# Patient Record
Sex: FEMALE | Race: BLACK OR AFRICAN AMERICAN | NOT HISPANIC OR LATINO | Employment: FULL TIME | URBAN - METROPOLITAN AREA
[De-identification: names, ages, dates, MRNs, and addresses within clinical notes are randomized per-mention and may not be internally consistent; named-entity substitution may affect disease eponyms.]

---

## 2024-07-08 ENCOUNTER — TELEPHONE (OUTPATIENT)
Dept: TRANSPLANT | Facility: CLINIC | Age: 54
End: 2024-07-08
Payer: COMMERCIAL

## 2024-07-08 NOTE — TELEPHONE ENCOUNTER
MA notes per Pre Dialysis adherence form     FOR THE PAST THREE MONTHS:    0-Appt Adhrence  0-No show    No concerns with labs, care giving, transportation, or mental health    Scanned in pt's media     Verito Jerry  Abdominal Transplant MA

## 2024-07-11 ENCOUNTER — OFFICE VISIT (OUTPATIENT)
Dept: TRANSPLANT | Facility: CLINIC | Age: 54
End: 2024-07-11
Payer: COMMERCIAL

## 2024-07-11 ENCOUNTER — HOSPITAL ENCOUNTER (OUTPATIENT)
Dept: RADIOLOGY | Facility: HOSPITAL | Age: 54
Discharge: HOME | End: 2024-07-11
Payer: COMMERCIAL

## 2024-07-11 VITALS
SYSTOLIC BLOOD PRESSURE: 143 MMHG | OXYGEN SATURATION: 100 % | WEIGHT: 166 LBS | BODY MASS INDEX: 30.55 KG/M2 | HEART RATE: 69 BPM | DIASTOLIC BLOOD PRESSURE: 90 MMHG | RESPIRATION RATE: 16 BRPM | TEMPERATURE: 97 F | HEIGHT: 62 IN

## 2024-07-11 DIAGNOSIS — N18.6 END STAGE RENAL DISEASE: ICD-10-CM

## 2024-07-11 DIAGNOSIS — Z71.85 IMMUNIZATION COUNSELING: ICD-10-CM

## 2024-07-11 DIAGNOSIS — Z76.82 ORGAN TRANSPLANT CANDIDATE: ICD-10-CM

## 2024-07-11 DIAGNOSIS — E08.22 DIABETES MELLITUS DUE TO UNDERLYING CONDITION WITH STAGE 5 CHRONIC KIDNEY DISEASE NOT ON CHRONIC DIALYSIS, WITHOUT LONG-TERM CURRENT USE OF INSULIN: ICD-10-CM

## 2024-07-11 DIAGNOSIS — N18.5 DIABETES MELLITUS DUE TO UNDERLYING CONDITION WITH STAGE 5 CHRONIC KIDNEY DISEASE NOT ON CHRONIC DIALYSIS, WITHOUT LONG-TERM CURRENT USE OF INSULIN: ICD-10-CM

## 2024-07-11 DIAGNOSIS — I12.9 BENIGN HYPERTENSION WITH CKD (CHRONIC KIDNEY DISEASE) STAGE IV: ICD-10-CM

## 2024-07-11 DIAGNOSIS — F32.A DEPRESSIVE DISORDER: ICD-10-CM

## 2024-07-11 DIAGNOSIS — E78.49 OTHER HYPERLIPIDEMIA: ICD-10-CM

## 2024-07-11 DIAGNOSIS — N18.4 BENIGN HYPERTENSION WITH CKD (CHRONIC KIDNEY DISEASE) STAGE IV: ICD-10-CM

## 2024-07-11 DIAGNOSIS — R74.01 ELEVATED ALT MEASUREMENT: ICD-10-CM

## 2024-07-11 DIAGNOSIS — Z01.818 PRE-TRANSPLANT EVALUATION FOR KIDNEY TRANSPLANT: Primary | ICD-10-CM

## 2024-07-11 PROBLEM — E11.21 DIABETIC NEPHROPATHY: Chronic | Status: ACTIVE | Noted: 2021-08-04

## 2024-07-11 PROBLEM — F17.200 NICOTINE DEPENDENCE: Status: ACTIVE | Noted: 2019-04-01

## 2024-07-11 PROBLEM — I10 BENIGN ESSENTIAL HYPERTENSION: Status: ACTIVE | Noted: 2019-03-29

## 2024-07-11 PROBLEM — N05.1 FOCAL GLOMERULAR SCLEROSIS: Status: ACTIVE | Noted: 2018-03-21

## 2024-07-11 PROBLEM — E11.21 DIABETIC NEPHROPATHY: Chronic | Status: RESOLVED | Noted: 2021-08-04 | Resolved: 2024-07-11

## 2024-07-11 PROBLEM — E78.5 HYPERLIPIDEMIA: Status: ACTIVE | Noted: 2017-02-01

## 2024-07-11 PROBLEM — R92.8 OTHER ABNORMAL AND INCONCLUSIVE FINDINGS ON DIAGNOSTIC IMAGING OF BREAST: Status: ACTIVE | Noted: 2024-04-24

## 2024-07-11 PROBLEM — K21.9 GASTROESOPHAGEAL REFLUX DISEASE WITHOUT ESOPHAGITIS: Status: ACTIVE | Noted: 2018-07-25

## 2024-07-11 PROCEDURE — 3044F HG A1C LEVEL LT 7.0%: CPT | Mod: CPTII,S$GLB,TXP, | Performed by: NURSE PRACTITIONER

## 2024-07-11 PROCEDURE — 3080F DIAST BP >= 90 MM HG: CPT | Mod: CPTII,S$GLB,TXP, | Performed by: NURSE PRACTITIONER

## 2024-07-11 PROCEDURE — 72192 CT PELVIS W/O DYE: CPT | Mod: TC,TXP

## 2024-07-11 PROCEDURE — 99999 PR PBB SHADOW E&M-EST. PATIENT-LVL V: CPT | Mod: PBBFAC,TXP,, | Performed by: NURSE PRACTITIONER

## 2024-07-11 PROCEDURE — 3077F SYST BP >= 140 MM HG: CPT | Mod: CPTII,S$GLB,TXP, | Performed by: NURSE PRACTITIONER

## 2024-07-11 PROCEDURE — 76770 US EXAM ABDO BACK WALL COMP: CPT | Mod: TC,TXP

## 2024-07-11 PROCEDURE — 1160F RVW MEDS BY RX/DR IN RCRD: CPT | Mod: CPTII,S$GLB,TXP, | Performed by: NURSE PRACTITIONER

## 2024-07-11 PROCEDURE — 99203 OFFICE O/P NEW LOW 30 MIN: CPT | Mod: S$GLB,TXP,, | Performed by: STUDENT IN AN ORGANIZED HEALTH CARE EDUCATION/TRAINING PROGRAM

## 2024-07-11 PROCEDURE — 93978 VASCULAR STUDY: CPT | Mod: TC,TXP

## 2024-07-11 PROCEDURE — 97802 MEDICAL NUTRITION INDIV IN: CPT | Mod: S$GLB,TXP,,

## 2024-07-11 PROCEDURE — 1159F MED LIST DOCD IN RCRD: CPT | Mod: CPTII,S$GLB,TXP, | Performed by: NURSE PRACTITIONER

## 2024-07-11 PROCEDURE — 3008F BODY MASS INDEX DOCD: CPT | Mod: CPTII,S$GLB,TXP, | Performed by: NURSE PRACTITIONER

## 2024-07-11 PROCEDURE — 71046 X-RAY EXAM CHEST 2 VIEWS: CPT | Mod: TC,TXP

## 2024-07-11 PROCEDURE — 4010F ACE/ARB THERAPY RXD/TAKEN: CPT | Mod: CPTII,S$GLB,TXP, | Performed by: NURSE PRACTITIONER

## 2024-07-11 PROCEDURE — 99215 OFFICE O/P EST HI 40 MIN: CPT | Mod: S$GLB,TXP,, | Performed by: NURSE PRACTITIONER

## 2024-07-11 RX ORDER — FUROSEMIDE 20 MG/1
20 TABLET ORAL DAILY PRN
COMMUNITY

## 2024-07-11 RX ORDER — DOXAZOSIN 4 MG/1
4 TABLET ORAL NIGHTLY
COMMUNITY

## 2024-07-11 RX ORDER — ALLOPURINOL 100 MG/1
100 TABLET ORAL DAILY
COMMUNITY

## 2024-07-11 RX ORDER — ATORVASTATIN CALCIUM 40 MG/1
40 TABLET, FILM COATED ORAL DAILY
COMMUNITY

## 2024-07-11 RX ORDER — BISOPROLOL FUMARATE 5 MG/1
2.5 TABLET, FILM COATED ORAL DAILY
COMMUNITY

## 2024-07-11 RX ORDER — DILTIAZEM HYDROCHLORIDE 240 MG/1
240 CAPSULE, COATED, EXTENDED RELEASE ORAL DAILY
COMMUNITY

## 2024-07-11 RX ORDER — AMOXICILLIN 500 MG
2 CAPSULE ORAL DAILY
COMMUNITY

## 2024-07-11 RX ORDER — ROSUVASTATIN CALCIUM 5 MG/1
5 TABLET, COATED ORAL DAILY
COMMUNITY
End: 2024-07-11

## 2024-07-11 RX ORDER — DAPAGLIFLOZIN 5 MG/1
5 TABLET, FILM COATED ORAL DAILY
COMMUNITY

## 2024-07-11 RX ORDER — OLMESARTAN MEDOXOMIL 40 MG/1
40 TABLET ORAL DAILY
COMMUNITY

## 2024-07-11 RX ORDER — CLOBETASOL PROPIONATE 0.5 MG/G
1 OINTMENT TOPICAL 2 TIMES DAILY
COMMUNITY

## 2024-07-11 RX ORDER — PANTOPRAZOLE SODIUM 40 MG/1
40 TABLET, DELAYED RELEASE ORAL DAILY PRN
COMMUNITY

## 2024-07-11 NOTE — PROGRESS NOTES
PHARM.D. PRE-TRANSPLANT NOTE:    This patient's medication therapy was evaluated as part of her pre-transplant evaluation.      The following general pharmacologic concerns were noted: Patient currently on diltiazem.     The following concerns for post-operative pain management were noted: None    The following pharmacologic concerns related to HCV therapy were noted: None      This patient's medication profile was reviewed for considerations for DAA Hepatitis C therapy:    [X]  No current inducers of CYP 3A4 or PGP  [X]  No amiodarone on this patient's EMR profile in the last 24 months  [X]  No past or current atrial fibrillation on this patient's EMR profile       Current Outpatient Medications   Medication Sig Dispense Refill    allopurinoL (ZYLOPRIM) 100 MG tablet Take 100 mg by mouth once daily.      atorvastatin (LIPITOR) 40 MG tablet Take 40 mg by mouth once daily.      bisoprolol (ZEBETA) 5 MG tablet Take 2.5 mg by mouth once daily.      clobetasol 0.05% (TEMOVATE) 0.05 % Oint Apply 1 g topically 2 (two) times daily.      dapagliflozin propanediol (FARXIGA) 5 mg Tab tablet Take 5 mg by mouth once daily.      diltiaZEM (CARDIZEM CD) 240 MG 24 hr capsule Take 240 mg by mouth once daily.      doxazosin (CARDURA) 4 MG tablet Take 4 mg by mouth every evening.      furosemide (LASIX) 20 MG tablet Take 20 mg by mouth daily as needed.      multivitamin with minerals tablet Take 1 tablet by mouth once daily.      olmesartan (BENICAR) 40 MG tablet Take 40 mg by mouth once daily.      omega-3 fatty acids/fish oil (FISH OIL-OMEGA-3 FATTY ACIDS) 300-1,000 mg capsule Take 2 capsules by mouth once daily.      pantoprazole (PROTONIX) 40 MG tablet Take 40 mg by mouth daily as needed.      rosuvastatin (CRESTOR) 5 MG tablet Take 5 mg by mouth once daily.       No current facility-administered medications for this visit.           I am available for consultation and can be contacted, as needed by the other members of the  Transplant team.

## 2024-07-11 NOTE — PROGRESS NOTES
PRE-TRANSPLANT INFECTIOUS DISEASE CONSULT    Reason for Visit:  Pre-transplant evaluation  Referring Provider: Dr. Simi Monsalve     History of Present Illness:    54 y.o. female with a history of HTN, DM2, FSGS, ESRD (pre-dialysis)  --  presents for pre-kidney transplant evaluation.    Infectious History:  Recent hospital admissions: No  Recent infections: No  Recent or current antibiotic use: No  History of recurrent infections *(sinus / pneumonia / UTI / SBP)*: No  History of diabetic foot wound or bone/joint infection: No  Recent dental infections, issues or procedures: No  History of chicken pox: No  History of shingles: No  History of STI: No  History of COVID infection: No    History of Immunosuppression:  Prior chemotherapy / immunosuppression: No  Prior transplant: No  History of splenectomy: No    Tuberculosis:  Prior screening for latent TB: Yes  Prior diagnosis of latent TB: No  Risk factors for TB *known exposure, incarceration, homelessness*: No    Geographical exposures:  Currently lives in [place] with , and daughter  Lived in the following states: [place], [place]  Lived or travelled to the [facility] [place]: Yes -- ordered cocci ab  International travel: No  Travel-associated illness: No    Social/Environmental:  Occupation:    Pets: Yes, 1 dog  Livestock: No  Fishing / hunting: No  Hobbies: home life  Water: [place]  Consumption of raw/undercooked meat or seafood?  No  Tobacco: No  Alcohol: No  Recreational drug use:  No  Sexual partners:  to , mutually monogamous.       Past Histories:   Past Medical History:   Diagnosis Date    Allergic rhinitis due to pollen     Anemia     Costochondritis     Depression     Diabetes mellitus, type 2     Diabetic nephropathy     Disorder of kidney and ureter     Fibroids     Focal glomerular sclerosis     GERD (gastroesophageal reflux disease)     History of lump in breast     Left    Hyperlipidemia     Hypertension     Ischemic heart disease      Nicotine dependence     Palpitations     Primigravida of advanced maternal age     Sleep apnea, unspecified      Past Surgical History:   Procedure Laterality Date    BREAST SURGERY       SECTION      CHOLECYSTECTOMY      LAPAROSCOPIC TOTAL HYSTERECTOMY      RENAL BIOPSY       No family history on file.  Social History     Tobacco Use    Smoking status: Never    Smokeless tobacco: Never   Substance Use Topics    Alcohol use: Not Currently    Drug use: Never     Review of patient's allergies indicates:  No Known Allergies      Current antibiotics:  Antibiotics (From admission, onward)      None              Review of Systems  Review of Systems   Constitutional: Negative.   All other systems reviewed and are negative.         Objective  Physical Exam  Vitals reviewed.   Constitutional:       General: She is not in acute distress.     Appearance: Normal appearance. She is normal weight. She is not ill-appearing, toxic-appearing or diaphoretic.   HENT:      Nose: No congestion.   Eyes:      General: No scleral icterus.     Conjunctiva/sclera: Conjunctivae normal.   Cardiovascular:      Rate and Rhythm: Normal rate.   Pulmonary:      Effort: No respiratory distress.   Musculoskeletal:         General: No swelling or tenderness.   Skin:     General: Skin is warm and dry.      Findings: No rash.   Neurological:      Mental Status: She is alert and oriented to person, place, and time. Mental status is at baseline.   Psychiatric:         Behavior: Behavior normal.         Thought Content: Thought content normal.           Labs:    CBC:   Lab Results   Component Value Date    WBC 6.29 2024    HGB 11.1 (L) 2024    HCT 34.4 (L) 2024    MCV 92 2024     2024    GRAN 3.6 2024    GRAN 57.0 2024    LYMPH 1.9 2024    LYMPH 29.4 2024    MONO 0.5 2024    MONO 7.2 2024    EOSINOPHIL 5.1 2024       Syphilis screening:   Lab Results   Component Value  "Date    TREPABIGMIGG Nonreactive 07/11/2024        TB screening: No results found for: "TBGOLDPLUS", "TSPOTSCREN"    HIV screening:   Lab Results   Component Value Date    [id number] Non-reactive 07/11/2024       Strongyloides IgG: No results found for: "STRONGANTIGG"    Hepatitis Serologies:   Lab Results   Component Value Date    HEPAIGG Non-reactive 07/11/2024    HEPBCAB Non-reactive 07/11/2024    HEPBSAB <3.00 07/11/2024    HEPBSAB Non-reactive 07/11/2024    HEPCAB Non-reactive 07/11/2024        Varicella IgG: No results found for: "VARICELLAINT"      Immunization History   Administered Date(s) Administered    COVID-19 MRNA, LN-S PF (MODERNA HALF 0.25 ML DOSE) 12/13/2021    COVID-19, MRNA, LN-S, PF (MODERNA FULL 0.5 ML DOSE) 03/17/2021, 04/14/2021    COVID-19, mRNA, LNP-S, PF (Moderna 2023)Ages 12+ 01/21/2024    COVID-19, mRNA, LNP-S, bivalent booster, PF (Moderna Omicron)12 + YEARS 10/09/2022    COVID-19, mRNA, LNP-S, bivalent booster, PF (PFIZER OMICRON) 10/09/2022    Influenza - Quadrivalent 09/15/2020    Influenza - Quadrivalent - PF *Preferred* (6 months and older) 11/01/2022, 01/14/2024    Pneumococcal Conjugate - 13 Valent 03/07/2022        Immunization History:  Received all childhood vaccines: Yes  All household members receive annual flu vaccine: Yes  All household members are up to date on COVID vaccine: Yes    Assessment and Plan    1. Risks of Infection: Available serologies were reviewed. No unusual risks of infection or significant barriers to transplantation were identified from the infectious disease standpoint given the information available at this time.    - Acute infectious issues: None   - Pending serologies: Hepatitis B surface Ag, Hepatitis C Ab, Quantiferon gold / T-spot, Treponema Ab / RPR / FTA, and Strongyloides IgG   - Please call if any pending serologic testing is positive.    2. Immunizations:  Based on the patient's immunization history and serologies, the following immunizations " are recommended:  - Hepatitis A    Patient does not have immunity to hepatitis A    Vaccination ordered today: Yes   - Hepatitis B    Patient does not have immunity to hepatitis B    Vaccination ordered today: Yes   - COVID    Current CDC vaccination recommendations were discussed with the patient   - Annual high dose influenza     Vaccination ordered today: No. Reason for not ordering: Vaccination up to date   - Prevnar 20    Vaccination ordered today: Yes   - Tdap    Vaccination ordered today: Yes   - Shingrix    Vaccination ordered today: Yes    Recommended Pre-Transplant Immunization Schedule   Vaccine  0m 1m 2m 6m   Pneumococcal conjugate vaccine (Prevnar 20) X      Tetanus-diphtheria-pertussis (Tdap)* X      Hepatitis A Vaccine (Havrix)** X   X   Hepatitis B Vaccine (Heplisav)** X X     Influenza (annual) X      Zoster Recombinant Vaccine (Shingrix) X  X           *Administer booster every 10 years.       **Administer if no immunity demonstrated on serologies               Patient will receive vaccines at local pharmacy. A written prescription was provided for all vaccine doses.     3. Counseling:   I discussed with the patient the risk for increased susceptibility to infections following transplantation including increased risk for infection right after transplant and if rejection should occur.  The patient has been counseled on the importance of vaccinations to decrease risk of infection and severe illness. Specific guidance has been provided to the patient regarding the patient's occupation, hobbies and activities to avoid future infectious complications.     4. Transplant Candidacy: Based on available information, there are no identified significant barriers to transplantation from an infectious disease standpoint.  Final determination of transplant candidacy will be made once evaluation is complete and reviewed by the Selection Committee.      Follow up with infectious disease as needed.       The total  time for evaluation and management services performed on 07/11/2024 was greater than 30 minutes.

## 2024-07-11 NOTE — PROGRESS NOTES
"INITIAL PATIENT EDUCATION NOTE     Ms. Casper Israel was seen in pre-kidney transplant clinic for evaluation for kidney, kidney/pancreas or pancreas only transplant.  The patient attended an individual video education session that discussed/reviewed the following aspects of transplantation: evaluation including diagnostic and laboratory testing,(Chemistries, Hematology, Serologies including HIV and Hepatitis and HLA) required for transplantation and selection committee process, UNOS waitlist management/multiple listings, types of organs offered (KDPI < 85%, KDPI > 85%, PHS risk, DCD, HCV+, HIV+ for HIV+ recipients and enbloc/dual), financial aspects, surgical procedures, dietary instruction pre- and post-transplant, health maintenance pre- and post-transplant, post-transplant hospitalization and outpatient follow-up, potential to participate in a research protocol, and medication management and side effects.  A question and answer session was provided after the presentation.    The patient was seen by all members of the multi-disciplinary team to include: Nephrologist/BRIGITTE, Surgeon, , Transplant Coordinator, , Pharmacist and Dietician (if applicable).    The patient reviewed and signed all consents for evaluation which were witnessed and sent to scanning into the [facility] chart.    The patient was given an education book and plan for further evaluation based on her individual assessment.     The Patient was educated on OPTN policy change regarding race based eGFR. For Black or  Americans, this eGFR could have shown that their kidneys were working better than they were.    Because of this change, we are looking at everyone's record and assessing waiting time for people who are eligible. We will be reviewing your medical records and will notify you if you are eligible. We also encouraged patient to provide "span 20 labs" that are not in our electronic medical records.     Reviewed " program requirement for complete COVID vaccination with documentation prior to listing.  COVID education information reviewed with patient. Patient encouraged to be up to date on all vaccinations.     The patient was informed that the transplant team would manage immediate post op pain. If the patient requires long term pain management, they will need to have that pain management addressed by their PCP or previous provider who wrote for long term pain medicines.    The patient was encouraged to call with any questions or concerns.

## 2024-07-11 NOTE — PROGRESS NOTES
Transplant Nephrology  Kidney Transplant Recipient Evaluation    Referring Physician: Simi Monsalve  Current Nephrologist: Simi Monsalve    Subjective:   CC:  Initial evaluation of kidney transplant candidacy.    HPI:  Ms. Israel is a 54 y.o. year old Black or  female who has presented to be evaluated as a potential kidney transplant recipient.  She has advanced kidney disease secondary to diabetic nephropathy.  Patient is currently pre-dialysis. She has  no   dialysis access.     Previous Transplant: no  Fx assessment  Cont to Work, has no problems with running errands or physical exertion.  Looks great, not frail    Donors:no  Discussed living donation       DM2  Dx ~   No meds  Complications : denies retinopathy, peripheral neuropathy and  gastroparesis   Foot wounds/PVD??  Lab Results   Component Value Date    HGBA1C 6.2 (H) 2024 underwent Left breast bx -- benign   Last MMG 24 : benign      kidney biopsy- global glomerulosclerosis, arteriosclerosis,interstitial fibrosis and tubular atrophy 2017        Elevated ALT--for over a year; since ~ 23 /(CE labs)  Lab Results   Component Value Date    ALT 80 (H) 2024    AST 30 2024    ALKPHOS 120 2024    BILITOT 0.3 2024       Past Medical and Surgical History: Ms. Israel  has a past medical history of Allergic rhinitis due to pollen, Anemia, Costochondritis, Depression, Diabetes mellitus, type 2, Diabetic nephropathy, Disorder of kidney and ureter, Fibroids, Focal glomerular sclerosis, GERD (gastroesophageal reflux disease), History of lump in breast, Hyperlipidemia, Hypertension, Ischemic heart disease, Nicotine dependence, Palpitations, Primigravida of advanced maternal age, and Sleep apnea, unspecified.  She has a past surgical history that includes Laparoscopic total hysterectomy; Cholecystectomy; Breast surgery;  section; and Renal biopsy.    Past Social and Family History: Ms.  Jhonatan reports that she quit smoking about 4 months ago. Her smoking use included cigarettes and vaping with nicotine. She has never used smokeless tobacco. She reports that she does not currently use alcohol. She reports that she does not use drugs. Her family history includes COPD in her sister; Heart attack in her father; Heart disease in her father; Kidney disease in her brother; Lupus in her sister; Sarcoidosis in her mother.    Past Medical History:   Diagnosis Date    Allergic rhinitis due to pollen     Anemia     Costochondritis     Depression     Diabetes mellitus, type 2     Diabetic nephropathy     Disorder of kidney and ureter     Fibroids     Focal glomerular sclerosis     GERD (gastroesophageal reflux disease)     History of lump in breast     Left    Hyperlipidemia     Hypertension     Ischemic heart disease     Nicotine dependence     Palpitations     Primigravida of advanced maternal age     Sleep apnea, unspecified        Review of Systems   Constitutional:  Positive for unexpected weight change. Negative for activity change, appetite change, chills, fatigue and fever.   HENT:  Negative for congestion, facial swelling, postnasal drip, rhinorrhea, sinus pressure, sore throat and trouble swallowing.    Eyes:  Negative for pain, redness and visual disturbance.   Respiratory:  Negative for cough, chest tightness, shortness of breath and wheezing.    Cardiovascular:  Positive for leg swelling. Negative for chest pain and palpitations.   Gastrointestinal:  Negative for abdominal pain, diarrhea, nausea and vomiting.   Genitourinary:  Negative for dysuria, flank pain and urgency.   Musculoskeletal:  Positive for back pain. Negative for gait problem, neck pain and neck stiffness.   Skin:  Negative for rash.   Allergic/Immunologic: Negative for environmental allergies, food allergies and immunocompromised state.   Neurological:  Negative for dizziness, weakness, light-headedness and headaches.  "  Psychiatric/Behavioral:  Negative for agitation and confusion. The patient is not nervous/anxious.        Objective:   Blood pressure (!) 143/90, pulse 69, temperature 97.2 °F (36.2 °C), temperature source Temporal, resp. rate 16, height 5' 2.4" (1.585 m), weight 75.3 kg (166 lb 0.1 oz), SpO2 100%.body mass index is 29.97 kg/m².    Physical Exam  Constitutional:       Appearance: Normal appearance. She is well-developed.   HENT:      Head: Normocephalic.      Nose: Nose normal.   Eyes:      Conjunctiva/sclera: Conjunctivae normal.      Pupils: Pupils are equal, round, and reactive to light.   Cardiovascular:      Rate and Rhythm: Normal rate and regular rhythm.      Heart sounds: Normal heart sounds.   Pulmonary:      Effort: Pulmonary effort is normal.      Breath sounds: Normal breath sounds.   Abdominal:      General: Bowel sounds are normal.      Palpations: Abdomen is soft. There is no hepatomegaly or splenomegaly.   Musculoskeletal:      Cervical back: Normal range of motion and neck supple.   Skin:     General: Skin is warm and dry.   Neurological:      Mental Status: She is alert and oriented to person, place, and time.   Psychiatric:         Behavior: Behavior normal.         Labs:  Lab Results   Component Value Date     07/11/2024     Lab Results   Component Value Date    WBC 6.29 07/11/2024    HGB 11.1 (L) 07/11/2024    HCT 34.4 (L) 07/11/2024     07/11/2024    K 4.3 07/11/2024     (H) 07/11/2024    CO2 19 (L) 07/11/2024    BUN 45 (H) 07/11/2024    CREATININE 3.7 (H) 07/11/2024    EGFRNORACEVR 13.8 (A) 07/11/2024    CALCIUM 10.1 07/11/2024    PHOS 4.7 (H) 07/11/2024    ALBUMIN 4.0 07/11/2024    AST 30 07/11/2024    ALT 80 (H) 07/11/2024    .2 (H) 07/11/2024       No results found for: "PREALBUMIN", "BILIRUBINUA", "GGT", "AMYLASE", "LIPASE", "PROTEINUA", "NITRITE", "RBCUA", "WBCUA"    No results found for: "HLAABCTYPE"    Labs were reviewed with the patient.    Assessment: "     1. Pre-transplant evaluation for kidney transplant    2. Immunization counseling    3. Benign hypertension with CKD (chronic kidney disease) stage IV    4. Diabetes mellitus due to underlying condition with stage 5 chronic kidney disease not on chronic dialysis, without long-term current use of insulin    5. Other hyperlipidemia    6. Depressive disorder    7. Elevated ALT measurement        Plan:   Elevated ALT--for over a year; since ~ 7/12/23 /(CE labs)  Hepatology referral   Lab Results   Component Value Date    ALT 80 (H) 07/11/2024       Transplant Candidacy:   Based on available information, Ms. Israel is a suitable kidney transplant candidate.   Meets center eligibility for accepting HCV+ donor offer - Yes.  Patient educated on HCV+ donors. Casper is willing to accept HCV+ donor offer - Yes   Patient is a candidate for KDPI > 85 kidney donor offer - Yes.  Final determination of transplant candidacy will be made once workup is complete and reviewed by the selection committee.    Patient advised that it is recommended that all transplant candidates, and their close contacts and household members receive Covid vaccination.    UNOS Patient Status  Functional Status: 80% - Normal activity with effort: some symptoms of disease     Anusha Miller NP

## 2024-07-11 NOTE — PROGRESS NOTES
Transplant Surgery  Kidney Transplant Recipient Evaluation    Referring Physician: Simi Monsalve  Current Nephrologist: Simi Monsalve    Subjective:     Reason for Visit: evaluate transplant candidacy    History of Present Illness: Casper Israel is a 54 y.o. year old female undergoing transplant evaluation.    Dialysis History: Casper is pre-dialysis.      Transplant History: N/A    Etiology of Renal Disease: Diabetes Mellitus - Type II (based on medical records from referral).    External provider notes reviewed: Yes    Review of Systems  Objective:     Physical Exam:  Constitutional:   Vitals reviewed: yes   Well-nourished and well-groomed: yes  Eyes:   Sclerae icteric: no   Extraocular movements intact: yes  GI:    Bowel sounds normal: yes   Tenderness: no    If yes, quadrant/location: not applicable   Palpable masses: no    If yes, quadrant/location: not applicable   Hepatosplenomegaly: no   Ascites: no   Hernia: no    If yes, type/location: not applicable   Surgical scars: no    If yes, type/location: Lap ports  Resp:   Effort normal: yes   Breath sounds normal: yes    CV:   Regular rate and rhythm: yes   Heart sounds normal: yes   Femoral pulses normal: yes   Extremities edematous: no  Skin:   Rashes or lesions present: no    If yes, describe:not applicable   Jaundice:: no    Musculoskeletal:   Gait normal: yes   Strength normal: yes  Psych:   Oriented to person, place, and time: yes   Affect and mood normal: yes    Additional comments: not applicable    Diagnostics:  The following labs have been reviewed: NA  The following radiology images have been independently reviewed and interpreted: NA    Counseling: We provided Casper Israel with a group education session today.  We discussed kidney transplantation at length with her, including risks, potential complications, and alternatives in the management of her renal failure.  The discussion included complications related to anesthesia, bleeding, infection,  primary nonfunction, and ATN.  I discussed the typical postoperative course, length of hospitalization, the need for long-term immunosuppression, and the need for long-term routine follow-up.  I discussed living-donor and -donor transplantation and the relative advantages and disadvantages of each.  I also discussed average waiting times for both living donation and  donation.  I discussed national and center-specific survival rates.  I also mentioned the potential benefit of multicenter listing to candidates listed with centers within more than one organ procurement organization.  All questions were answered.    Patient advised that it is recommended that all transplant candidates, and their close contacts and household members receive Covid vaccination.    Final determination of transplant candidacy will be made once evaluation is complete and reviewed by the Kidney & Kidney/Pancreas Selection Committee.    Coronavirus disease (COVID-19) caused by severe acute respiratory virus coronavirus 2 (SARS-C0V 2) is associated with increased mortality in solid organ transplant recipients (SOT) compared to non-transplant patients. Vaccine responses to vaccination are depressed against SARS-CoV2 compared to normal individuals but improve with third vaccination doses. Vaccination prior to SOT provides both the best opportunity for transplant candidates to develop protective immunity and to reduce the risk of serious COVID19 infections post transplantation. Organ transplant candidates at [facility] will be required to receive SARS-CoV-2 vaccination prior to being listed with a an active status, whenever possible. Exceptions will be made for disability related reasons or for sincerely held Jain beliefs.          Transplant Surgery - Candidacy   Assessment/Plan:   Casper Israel is pre-dialysis with CKD stage 4 (GFR 15-29 mL/min). I see no surgical contraindication to  placing a kidney transplant. Based on available information, Casper Israel is a suitable kidney transplant candidate.     Additional testing to be completed according to the Written Order Guidelines for Adult Pre-kidney and Pancreas Transplant Evaluation (KI-02).  Interpretation of tests and discussion of patient management involves all members of the multidisciplinary transplant team.    John Marie MD

## 2024-07-11 NOTE — LETTER
July 15, 2024        Simi Monsalve  [address] [place] [address]  Phone: [phone]  Fax: [phone]             [facility]st [facility]  [address]  [place] [address]  Phone: [phone]   Patient: Casper Israel   MR Number: [id number]   YOB: 1970   Date of Visit: 7/11/2024       Dear Dr. Simi Monsalve    Thank you for referring Casper Israel to me for evaluation. Attached you will find relevant portions of my assessment and plan of care.    If you have questions, please do not hesitate to call me. I look forward to following Casper Israel along with you.    Sincerely,    Anusha Miller, NP    Enclosure    If you would like to receive this communication electronically, please contact [email or web address] or [phone] to request LilLuxe Link access.    LilLuxe Link is a tool which provides read-only access to select patient information with whom you have a relationship. Its easy to use and provides real time access to review your patients record including encounter summaries, notes, results, and demographic information.    If you feel you have received this communication in error or would no longer like to receive these types of communications, please e-mail [email or web address]

## 2024-07-11 NOTE — PROGRESS NOTES
Transplant Recipient Adult Psychosocial Assessment    Casper Israel  [address] [place] [address]  Telephone Information:   Mobile [phone]   Home  There is no home phone number on file.  Work  There is no work phone number on file.  E-mail  [email or web address]    Sex: female  YOB: 1970  Age: 54 y.o.    Encounter Date: 2024  U[place] Citizen: yes  Primary Language: English   Needed: no    Emergency Contact:  Blake Israel, 61 yo , [place], does drive/own car, works full time 7 years for [employer]. [phone]    Family/Social Support:   Number of dependents/: pt denies  Marital history:  to Blake 24 years  Other family dynamics: Pt reports both parents . Pt reports supportive siblings (1 sister, 1 brother). Pt reports 2 supportive grown step children Laurel Israel ([facility]) and Blake Israel ([facility]). Pt reports lives with supportive  Blake and their 21 yo daughter Sabas in [place]. Pt reports  Blake and supportive niece Robbin ([place]) will be assisting as transplant caregivers.     Household Composition:  Pt reports lives with supportive  Blake and their 21 yo daughter Sabas in [place].  Blake Israel, 61 yo , [place], does drive/own car, works full time 7 years for [employer]. [phone]  Sabas Israel, 21 yo daughter, [place], does not drive. [phone]    Do you and your caregivers have access to reliable transportation? yes  PRIMARY CAREGIVER: Blake Israel , will be primary caregiver, phone number [phone]     provided in-depth information to patient and caregiver regarding pre- and post-transplant caregiver role.   strongly encourages patient and caregiver to have concrete plan regarding post-transplant care giving, including back-up caregiver(s) to ensure care giving needs are met as  needed.    Patient and Caregiver states understanding all aspects of caregiver role/commitment and is able/willing/committed to being caregiver to the fullest extent necessary.    Patient and Caregiver verbalizes understanding of the education provided today and caregiver responsibilities.         remains available. Patient and Caregiver agree to contact  in a timely manner if concerns arise.      Able to take time off work without financial concerns: yes.     Additional Significant Others who will Assist with Transplant:  Robbin Edwards, 32 yo niece, [place], does drive/own car, works at [employer]. [phone]    Living Will: no  Healthcare Power of : no  Advance Directives on file: <<no information> per medical record.  Verbally reviewed LW/HCPA information.   provided patient with copy of LW/HCPA documents and provided education on completion of forms.    Living Donors: Education and resource information given to patient.    Highest Education Level: Attended College/Technical School  Reading Ability: 12th grade  Reports difficulty with: N/A  Learns Best By:  multisensory     Status: no  VA Benefits: no     Working for Income: yes  If yes, working activity level: Working Full Time  Patient reports working full time as  for [employer] 33 years. Pt reports having ample sick time, 1200 hours and has STD/LTD benefits through work.    Spouse/Significant Other Employment: Pt's  Blake reports works full time 7 years for [employer].    Disabled: pt denies    Monthly Income:  Pt/ combined income: $4,600  Able to afford all costs now and if transplanted, including medications: yes  Patient and Caregiver verbalizes understanding of personal responsibilities related to transplant costs and the importance of having a financial plan to ensure that patients transplant costs are fully covered.      Social  worker provided fundraising information/education. Patient and Caregiververbalizes understanding.   remains available.    Insurance:   Payer/Plan Subscr  Sex Relation Sub. Ins. ID Effective Group Num   1. BLUE CROSS BL* MARGIE SEVERINO 1970 Female Self [id number]                                   [address] [address]     Primary Insurance (for UNOS reporting): Private Insurance BCBS thru patient's employer. Pt reports plan to contact [facility] about any transplant benefits for travel and lodging and will plan accordingly.  Secondary Insurance (for UNOS reporting): None Pt reports is not on dialysis. ESRD Medicare book provided to pt today.  Patient and Caregiver verbalizes clear understanding that patient may experience difficulty obtaining and/or be denied insurance coverage post-surgery. This includes and is not limited to disability insurance, life insurance, health insurance, burial insurance, long term care insurance, and other insurances.      Patient and Caregiver also reports understanding that future health concerns related to or unrelated to transplantation may not be covered by patient's insurance.  Resources and information provided and reviewed.     Patient and Caregiver provides verbal permission to release any necessary information to outside resources for patient care and discharge planning.  Resources and information provided are reviewed.      Dialysis Adherence: pt reports is not on dialysis    Infusion Service: patient utilizing? no  Home Health: patient utilizing? no  DME: yes bpc  Pulmonary/Cardiac Rehab: pt denies   ADLS:  independent    Adherence:   Pt reports high medical compliance with appointments and instructions within last 3 months. 24 completed compliance form shows suitable compliance Adherence education and counseling provided.     Per History Section:  Past Medical History:   Diagnosis Date    Allergic rhinitis due to pollen     Anemia     Costochondritis      Depression     Diabetes mellitus, type 2     Diabetic nephropathy     Disorder of kidney and ureter     Fibroids     Focal glomerular sclerosis     GERD (gastroesophageal reflux disease)     History of lump in breast     Left    Hyperlipidemia     Hypertension     Ischemic heart disease     Nicotine dependence     Palpitations     Primigravida of advanced maternal age     Sleep apnea, unspecified      Social History     Tobacco Use    Smoking status: Never    Smokeless tobacco: Never   Substance Use Topics    Alcohol use: Not Currently     Social History     Substance and Sexual Activity   Drug Use Never     Social History     Substance and Sexual Activity   Sexual Activity Not Currently       Per Today's Psychosocial:  Tobacco: none, patient denies any use at this time. Pt reports plan to abstain.  Alcohol: none, patient denies any use.  Illicit Drugs/Non-prescribed Medications: none, patient denies any use.    Patient and Caregiver states clear understanding of the potential impact of substance use as it relates to transplant candidacy and is aware of possible random substance screening.  Substance abstinence/cessation counseling, education and resources provided and reviewed.     Arrests/DWI/Treatment/Rehab: patient denies    Psychiatric History:    Mental Health: Pt denies any mental health history or mental health concerns at this time. Pt denies any need for mental health referral at this time.  Psychiatrist/Counselor: pt denies  Medications:  pt denies  Suicide/Homicide Issues: pt denies any si/hi history  Safety at home: Pt reports living in safe home environment with no abuse at this time.    Knowledge: Patient and Caregiver states having clear understanding and realistic expectations regarding the potential risks and potential benefits of organ transplantation and organ donation and agrees to discuss with health care team members and support system members, as well as to utilize available resources and  express questions and/or concerns in order to further facilitate the pt informed decision-making.  Resources and information provided and reviewed.    Patient and Caregiver is aware of [facility]'s affiliation and/or partnership with agencies in home health care, LTAC, SNF, [facility], and other hospitals and clinics.    Understanding: Patient and Caregiver reports having a clear understanding of the many lifetime commitments involved with being a transplant recipient, including costs, compliance, medications, lab work, procedures, appointments, concrete and financial planning, preparedness, timely and appropriate communication of concerns, abstinence (ETOH, tobacco, illicit non-prescribed drugs), adherence to all health care team recommendations, support system and caregiver involvement, appropriate and timely resource utilization and follow-through, mental health counseling as needed/recommended, and patient and caregiver responsibilities.  Social Service Handbook, resources and detailed educational information provided and reviewed.  Educational information provided.    Patient and Caregiver also reports current and expected compliance with health care regime and states having a clear understanding of the importance of compliance.      Patient and Caregiver reports a clear understanding that risks and benefits may be involved with organ transplantation and with organ donation.       Patient and Caregiver also reports clear understanding that psychosocial risk factors may affect patient, and include but are not limited to feelings of depression, generalized anxiety, anxiety regarding dependence on others, post traumatic stress disorder, feelings of guilt and other emotional and/or mental concerns, and/or exacerbation of existing mental health concerns.  Detailed resources provided and discussed.      Patient and Caregiver agrees to access appropriate resources in a timely manner as needed and/or as recommended, and to  communicate concerns appropriately.  Patient and Caregiver also reports a clear understanding of treatment options available.     Patient and Caregiver received education in a group setting.   reviewed education, provided additional information, and answered questions.    Feelings or Concerns: Pt reports high motivation to pursue kidney organ transplant at this time.    Coping: Identify Patient & Caregiver Strategies to [place]:   1. In the past, coping with major surgery and/or related stress - family support; dorinda and prayer; exercisces 30 mins a day on exercise bike   2. Currently & Pre-transplant - family support; dorinda and prayer; exercisces 30 mins a day on exercise bike   3. At the time of surgery - family support; dorinda and prayer   4. During post-Transplant & Recovery Period - family support; dorinda and prayer    Goals: Pt reports hope for successful kidney organ transplant so that she is not placed on dialysis.  Patient referred to Vocational Rehabilitation.    Interview Behavior: Patient and Caregiver presents as alert and oriented x 4, pleasant, good eye contact, well groomed, recall good, concentration/judgement good, average intelligence, calm, communicative, cooperative, and asking and answering questions appropriately. Pt's supportive  Blake and daughter Sabas in session with patient's permission.         Transplant Social Work - Candidacy  Assessment/Plan:     Psychosocial Suitability: Patient presents as low risk candidate for kidney transplant at this time. Pt reports having organ transplant caregiver/transportation plan, medical insurance plan and plan to afford transplant costs all in place.    Recommendations/Additional Comments: 7-8-24 medical compliance update shows suitable compliance. Pt is not on dialysis and not on Medicare; ESRD Medicare book provided to pt today. Pt reports plan to contact [facility] about any transplant benefits for travel and lodging and will plan  accordingly. Pt reports is working full time as  and has employee benefits of 1200 sick hours saved and has STD/LTD insurance.    SW recommends that pt conduct fundraising to assist pt with pay for cost of medications, food, gas, and other transplant related needs.  SW recommends that pt remain aware of potential mental health concerns and contact the team if any concerns arise.  SW recommends that pt remain abstinent from tobacco, ETOH, and drug use. SW supports pt's continued adherence. SW remains available to answer any questions or concerns that arise as the pt moves through the transplant process.      Final determination of transplant candidacy will be reviewed by the selection committee.      India PARKER LCSW

## 2024-07-11 NOTE — PROGRESS NOTES
TRANSPLANT NUTRITIONAL ASSESSMENT    Referring Provider: Simi Monsalve MD     Reason for Visit: Pre-kidney transplant work-up (pre-dialysis)    Age: 54 y.o.  Sex: female    Patient Active Problem List   Diagnosis    Pre-transplant evaluation for kidney transplant    Immunization counseling    Benign hypertension with CKD (chronic kidney disease) stage IV    Depressive disorder    Focal glomerular sclerosis    Gastroesophageal reflux disease without esophagitis    Hyperlipidemia    Nicotine dependence    Other abnormal and inconclusive findings on diagnostic imaging of breast    Diabetes mellitus due to underlying condition with stage 5 chronic kidney disease not on chronic dialysis, without long-term current use of insulin    Elevated ALT measurement     Past Medical History:   Diagnosis Date    Allergic rhinitis due to pollen     Anemia     Costochondritis     Depression     Diabetes mellitus, type 2     Diabetic nephropathy     Disorder of kidney and ureter     Fibroids     Focal glomerular sclerosis     GERD (gastroesophageal reflux disease)     History of lump in breast     Left    Hyperlipidemia     Hypertension     Ischemic heart disease     Nicotine dependence     Palpitations     Primigravida of advanced maternal age     Sleep apnea, unspecified      Lab Results   Component Value Date     (H) 07/11/2024    K 4.3 07/11/2024    PHOS 4.7 (H) 07/11/2024    CHOL 130 07/11/2024    HDL 44 07/11/2024    TRIG 62 07/11/2024    ALBUMIN 4.0 07/11/2024    HGBA1C 6.2 (H) 07/11/2024    CALCIUM 10.1 07/11/2024     Other Pertinent Labs: N/A    Current Outpatient Medications   Medication Sig    allopurinoL (ZYLOPRIM) 100 MG tablet Take 100 mg by mouth once daily.    atorvastatin (LIPITOR) 40 MG tablet Take 40 mg by mouth once daily.    bisoprolol (ZEBETA) 5 MG tablet Take 2.5 mg by mouth once daily.    clobetasol 0.05% (TEMOVATE) 0.05 % Oint Apply 1 g topically 2 (two) times daily.    dapagliflozin propanediol  "(FARXIGA) 5 mg Tab tablet Take 5 mg by mouth once daily.    diltiaZEM (CARDIZEM CD) 240 MG 24 hr capsule Take 240 mg by mouth once daily.    doxazosin (CARDURA) 4 MG tablet Take 4 mg by mouth every evening.    furosemide (LASIX) 20 MG tablet Take 20 mg by mouth daily as needed.    multivitamin with minerals tablet Take 1 tablet by mouth once daily.    olmesartan (BENICAR) 40 MG tablet Take 40 mg by mouth once daily.    omega-3 fatty acids/fish oil (FISH OIL-OMEGA-3 FATTY ACIDS) 300-1,000 mg capsule Take 2 capsules by mouth once daily.    pantoprazole (PROTONIX) 40 MG tablet Take 40 mg by mouth daily as needed.     No current facility-administered medications for this visit.     Allergies: Patient has no known allergies.    Ht Readings from Last 1 Encounters:   07/11/24 5' 2.4" (1.585 m)     Wt Readings from Last 1 Encounters:   07/11/24 75.3 kg (166 lb 0.1 oz)      BMI: Body mass index is 29.97 kg/m².    Usual Weight: 168 lb   Weight Change/Time: stable   Current Diet: regular   Appetite/Current Intake: good   Exercise/Physical Activity: functional in ADLs; exercise bike daily for 30 minutes   Nutritional/Herbal Supplements: eye vitamin, fish oil  Chewing/Swallowing Problems: none  Symptoms: none    Estimated Kcal Need: 1883 kcal/day (25 kcal/kg)   Estimated Protein Need: 60-68 gm/day (0.8-0.9 gm/kg)     Nutritional History:   Pt and caregiver present. Pt reports cooking from home often and using an air fryer. Consumes vegetables daily.     Diet Recall    Morning: omelette with spinach, mushrooms, cheese     Midday: light lunch; humus with chips and blackberries     Evening: enjoys pork occasionally, chicken, beef, fish, dried beans occasionally, homemade salad (lettuce, spinach, mushrooms, chicken, croutons, Ranch dressing), vegetables (green beans, potatoes, broccoli, baked beans)     Snacks: unsalted nuts, sunflowers seeds, fruit (grapes, banana, berries, apples)     Desserts: rarely     Beverages: 6-7 16.9 oz " bottled water/day, sweet tea occasionally       Seasonings: [place], garlic powder, pepper     Restaurants/Fast Food: 1-2x/month; [employer], [employer]       Nutritional Diagnoses  Problem: food- and nutrition-related knowledge deficit  Etiology: RT lack of previous education on pre-kidney transplant nutrition recommendations  Symptoms: AEB diet recall and questions from pt     Educational Need? yes  Barriers: none identified  Discussed with: patient and caregiver  Interventions: Patient taught nutrition information regarding Pre-kidney transplant work-up (pre-dialysis). Renal Nutrition Therapy packet reviewed (high/low food sources of K, Phos and protein, low sodium and fluid intake, emphasis on moderate protein intake). Encouraged physical activity daily, regular exercise as tolerated, stay mobile.   Goals/Recommendations: diet adherence and limit intake of high phosphorus foods  Actions Taken: instruct/provide written information  Patient and/or family comprehend instructions: yes  Outcome: Verbalizes understanding  Monitoring: Contact information provided, will f/u in clinic and communicate with the care team as needed.     Counseling Time: 20 minutes

## 2024-07-18 ENCOUNTER — DOCUMENTATION ONLY (OUTPATIENT)
Dept: TRANSPLANT | Facility: CLINIC | Age: 54
End: 2024-07-18
Payer: COMMERCIAL

## 2024-07-18 ENCOUNTER — TELEPHONE (OUTPATIENT)
Dept: TRANSPLANT | Facility: CLINIC | Age: 54
End: 2024-07-18
Payer: COMMERCIAL

## 2024-07-18 NOTE — TELEPHONE ENCOUNTER
Phoned patient and explained at length order sheet for additional Eval testing to be done closer to home. Patient verbalized understanding. Address confirmed. Orders mailed.

## 2024-08-01 ENCOUNTER — PATIENT MESSAGE (OUTPATIENT)
Dept: TRANSPLANT | Facility: CLINIC | Age: 54
End: 2024-08-01
Payer: COMMERCIAL

## 2024-08-01 ENCOUNTER — TELEPHONE (OUTPATIENT)
Dept: TRANSPLANT | Facility: CLINIC | Age: 54
End: 2024-08-01
Payer: COMMERCIAL

## 2024-08-01 NOTE — TELEPHONE ENCOUNTER
Spoke to pt regarding incomplete letter that was rcv'd. Pt stated she had a colonoscopy done in 2020 at [facility] in [place]. Pt is rachid'd to see Cardiology on 8/6 and her PCP put in a referral for Hepatology. Pt advised she can send message in portal with date for Hepatology appt. Pt voiced understanding.

## 2024-08-05 ENCOUNTER — PATIENT MESSAGE (OUTPATIENT)
Dept: TRANSPLANT | Facility: CLINIC | Age: 54
End: 2024-08-05
Payer: COMMERCIAL

## 2024-08-09 ENCOUNTER — PATIENT MESSAGE (OUTPATIENT)
Dept: TRANSPLANT | Facility: CLINIC | Age: 54
End: 2024-08-09
Payer: COMMERCIAL

## 2024-09-05 ENCOUNTER — PATIENT MESSAGE (OUTPATIENT)
Dept: TRANSPLANT | Facility: CLINIC | Age: 54
End: 2024-09-05
Payer: COMMERCIAL

## 2024-09-27 ENCOUNTER — PATIENT MESSAGE (OUTPATIENT)
Dept: TRANSPLANT | Facility: CLINIC | Age: 54
End: 2024-09-27
Payer: COMMERCIAL

## 2024-10-24 ENCOUNTER — PATIENT MESSAGE (OUTPATIENT)
Dept: TRANSPLANT | Facility: CLINIC | Age: 54
End: 2024-10-24
Payer: COMMERCIAL

## 2024-10-31 ENCOUNTER — PATIENT MESSAGE (OUTPATIENT)
Dept: TRANSPLANT | Facility: CLINIC | Age: 54
End: 2024-10-31
Payer: COMMERCIAL

## 2024-11-20 ENCOUNTER — PATIENT MESSAGE (OUTPATIENT)
Dept: TRANSPLANT | Facility: CLINIC | Age: 54
End: 2024-11-20
Payer: COMMERCIAL

## 2024-11-26 ENCOUNTER — TELEPHONE (OUTPATIENT)
Dept: TRANSPLANT | Facility: CLINIC | Age: 54
End: 2024-11-26
Payer: COMMERCIAL

## 2024-12-05 ENCOUNTER — TELEPHONE (OUTPATIENT)
Dept: TRANSPLANT | Facility: CLINIC | Age: 54
End: 2024-12-05
Payer: COMMERCIAL

## 2024-12-05 NOTE — TELEPHONE ENCOUNTER
MA notes per Pre Dialysis adherence form     FOR THE PAST THREE MONTHS:    0-Appt Adhrence  0-No show    No concerns with labs, care giving, transportation, or mental health    Per adherence form pt is very compliant.     Scanned in pt's media     Verito Jerry  Abdominal Transplant MA

## 2024-12-06 ENCOUNTER — TELEPHONE (OUTPATIENT)
Dept: TRANSPLANT | Facility: CLINIC | Age: 54
End: 2024-12-06
Payer: COMMERCIAL

## 2024-12-10 ENCOUNTER — PATIENT MESSAGE (OUTPATIENT)
Dept: TRANSPLANT | Facility: CLINIC | Age: 54
End: 2024-12-10
Payer: COMMERCIAL

## 2025-01-03 ENCOUNTER — PATIENT MESSAGE (OUTPATIENT)
Dept: TRANSPLANT | Facility: CLINIC | Age: 55
End: 2025-01-03
Payer: COMMERCIAL

## 2025-02-10 ENCOUNTER — PATIENT MESSAGE (OUTPATIENT)
Dept: TRANSPLANT | Facility: CLINIC | Age: 55
End: 2025-02-10
Payer: COMMERCIAL

## 2025-02-10 ENCOUNTER — TELEPHONE (OUTPATIENT)
Dept: TRANSPLANT | Facility: CLINIC | Age: 55
End: 2025-02-10
Payer: COMMERCIAL

## 2025-02-12 ENCOUNTER — EPISODE CHANGES (OUTPATIENT)
Dept: TRANSPLANT | Facility: CLINIC | Age: 55
End: 2025-02-12

## 2025-02-12 PROCEDURE — 86833 HLA CLASS II HIGH DEFIN QUAL: CPT | Mod: TXP | Performed by: NURSE PRACTITIONER

## 2025-02-12 PROCEDURE — 86832 HLA CLASS I HIGH DEFIN QUAL: CPT | Mod: TXP | Performed by: NURSE PRACTITIONER

## 2025-02-12 PROCEDURE — 86977 RBC SERUM PRETX INCUBJ/INHIB: CPT | Mod: TXP | Performed by: NURSE PRACTITIONER

## 2025-02-12 PROCEDURE — 86825 HLA X-MATH NON-CYTOTOXIC: CPT | Mod: 91,TXP | Performed by: NURSE PRACTITIONER

## 2025-02-12 PROCEDURE — 86825 HLA X-MATH NON-CYTOTOXIC: CPT | Mod: TXP | Performed by: NURSE PRACTITIONER

## 2025-02-13 ENCOUNTER — LAB VISIT (OUTPATIENT)
Dept: LAB | Facility: HOSPITAL | Age: 55
End: 2025-02-13
Payer: COMMERCIAL

## 2025-02-13 DIAGNOSIS — Z76.82 ORGAN TRANSPLANT CANDIDATE: Primary | ICD-10-CM

## 2025-02-13 DIAGNOSIS — Z76.82 ORGAN TRANSPLANT CANDIDATE: ICD-10-CM

## 2025-02-13 PROCEDURE — 86825 HLA X-MATH NON-CYTOTOXIC: CPT | Mod: 91,TXP | Performed by: NURSE PRACTITIONER

## 2025-02-13 PROCEDURE — 86825 HLA X-MATH NON-CYTOTOXIC: CPT | Mod: TXP | Performed by: NURSE PRACTITIONER

## 2025-02-13 PROCEDURE — 86826 HLA X-MATCH NONCYTOTOXC ADDL: CPT | Mod: TXP | Performed by: NURSE PRACTITIONER

## 2025-02-13 PROCEDURE — 86826 HLA X-MATCH NONCYTOTOXC ADDL: CPT | Mod: 91,TXP | Performed by: NURSE PRACTITIONER

## 2025-02-14 LAB
B CELL RESULTS - XM ALLO: NEGATIVE
B CELL RESULTS - XM ALLO: NEGATIVE
B MCS AVERAGE - XM ALLO: -29.3
B MCS AVERAGE - XM ALLO: 7.6
DONOR MRN: NORMAL
DONOR MRN: NORMAL
FXMAL TESTING DATE: NORMAL
FXMAL TESTING DATE: NORMAL
HLA FLOW CROSSMATCH (ALLO) INTERPRETATION: NORMAL
SERUM COLLECTION DT - XM ALLO: NORMAL
SERUM COLLECTION DT - XM ALLO: NORMAL
T CELL RESULTS - XM ALLO: NEGATIVE
T CELL RESULTS - XM ALLO: NEGATIVE
T MCS AVERAGE - XM ALLO: -16.9
T MCS AVERAGE - XM ALLO: -41

## 2025-02-20 ENCOUNTER — PATIENT MESSAGE (OUTPATIENT)
Dept: TRANSPLANT | Facility: CLINIC | Age: 55
End: 2025-02-20
Payer: COMMERCIAL

## 2025-02-20 LAB
B CELL RESULTS - XM AUTO: NORMAL
B MCS AVERAGE - XM AUTO: -13.4
CLASS I ANTIBODIES - LUMINEX: NORMAL
CLASS II ANTIBODY COMMENTS - LUMINEX: NORMAL
CPRA %: 0
CPRA %: 1
CPRA %: 1
FXMAU TESTING DATE: NORMAL
HPRA INTERPRETATION: NORMAL
SERUM COLLECTION DT - LUMINEX CLASS I: NORMAL
SERUM COLLECTION DT - LUMINEX CLASS II: NORMAL
SERUM COLLECTION DT - XM AUTO: NORMAL
SPCL1 TESTING DATE: NORMAL
SPCL2 TESTING DATE: NORMAL
SPLUA TESTING DATE: NORMAL
T CELL RESULTS - XM AUTO: NORMAL
T MCS AVERAGE - XM AUTO: 5.3

## 2025-02-28 ENCOUNTER — TELEPHONE (OUTPATIENT)
Dept: TRANSPLANT | Facility: CLINIC | Age: 55
End: 2025-02-28
Payer: COMMERCIAL

## 2025-03-06 ENCOUNTER — TELEPHONE (OUTPATIENT)
Dept: TRANSPLANT | Facility: CLINIC | Age: 55
End: 2025-03-06
Payer: COMMERCIAL

## 2025-03-06 NOTE — TELEPHONE ENCOUNTER
MA spoke with Eugenia ( Dr Monsalve, Simi's nurse) with [facility]  [phone] )     She stated she did receive the compliance for via email and will send asap.  MA called LM due to not receiving the compliance form. MA will f/u in the morning on 3/7/25)    Verito Jerry  Transplant Abdominal MA

## 2025-03-07 ENCOUNTER — COMMITTEE REVIEW (OUTPATIENT)
Dept: TRANSPLANT | Facility: CLINIC | Age: 55
End: 2025-03-07
Payer: COMMERCIAL

## 2025-03-07 NOTE — COMMITTEE REVIEW
Native Organ Dx: Diabetes Mellitus - Type II      SELECTION COMMITTEE NOTE    Casper Israel was presented at selection committee on 3/7/25.  Patient met selection criteria for kidney transplant related to CKD, Stage 5 due to    Diabetes Mellitus - Type II.  No absolute contraindications to transplant at this time.  Patient will be placed on the cadaveric wait list pending final financial approval from insurance company.  Patient will return to clinic for routine appointment in 12 month(s). Patient will follow up with Hepatology in 6 months for follow-up of elevated liver enzymes.  Patient meets criteria for High KDPI kidney offer. Patient meets HCV+ kidney offer. Patient meets criteria for dual/enbloc, due to Committee decision.  Planned immunosuppression Thymoglobulin.        Note written by Nissa Collins RN    ===============================================

## 2025-03-07 NOTE — LETTER
March 7, 2025    Simi Monsalve MD  [address] [place] [address]  Phone: [phone]  Fax: [phone]     Dear Dr. Monsalve:    Patient: Casper Israel   MR Number: [id number]   YOB: 1970     Your patient, Casper Israel, was recently discussed at the [facility] Selection Committee meeting on 3/7/25. I am happy to inform you that Casper has been approved for transplantation.  She has met selection criteria for a kidney transplant related to CKD stage 5 secondary to primary diagnosis of Diabetes Mellitus - Type II. Your patient will be placed on the cadaveric wait list pending final financial approval from insurance company.     We appreciate your confidence in allowing us to participate in your patients care.  If you have any questions or concerns, please do not hesitate to contact me.    Sincerely,      Lilly Cardoza MD  Medical Director, Kidney & Kidney/Pancreas Transplantation    CC:  Casper Israel (patient)

## 2025-03-11 ENCOUNTER — PATIENT MESSAGE (OUTPATIENT)
Dept: TRANSPLANT | Facility: CLINIC | Age: 55
End: 2025-03-11
Payer: COMMERCIAL

## 2025-05-01 ENCOUNTER — TELEPHONE (OUTPATIENT)
Dept: TRANSPLANT | Facility: CLINIC | Age: 55
End: 2025-05-01
Payer: COMMERCIAL

## 2025-05-01 ENCOUNTER — PATIENT MESSAGE (OUTPATIENT)
Dept: TRANSPLANT | Facility: CLINIC | Age: 55
End: 2025-05-01
Payer: COMMERCIAL

## 2025-05-01 ENCOUNTER — EPISODE CHANGES (OUTPATIENT)
Dept: TRANSPLANT | Facility: CLINIC | Age: 55
End: 2025-05-01

## 2025-05-01 DIAGNOSIS — Z76.82 AWAITING ORGAN TRANSPLANT STATUS: Primary | ICD-10-CM

## 2025-05-01 DIAGNOSIS — Z76.82 ORGAN TRANSPLANT CANDIDATE: ICD-10-CM

## 2025-05-01 NOTE — LETTER
May 1, 2025    Casper Israel  [address] [place] [address]    Dear Casper Israel:  MRN: [id number]    Congratulations! On 2025, you were placed on  the waiting list for a  donor transplant.    Your candidacy for kidney transplant is based on the following criteria:  CKD, Stage 5 due to    Diabetes Mellitus - Type II .    Your transplant coordinator while on the waiting list is Jaquelin Arciniega RN. They can be reached at [phone] or [phone] with any questions.      What to do now?    Ask your living donors to begin testing   Share our screening website with anyone interested: [email or web address]  Make sure donors have your name and date of birth  You will get transplanted much faster if you have a living donor    Have your blood sent to our Transplant Lab every month  If you are on dialysis - our Transplant Lab will work with your dialysis unit to send your blood every month  If you are not on dialysis   If you live near an [facility] lab, we will schedule you to have blood drawn every month  If you do not live near an [facility] lab, you will be sent blood kits in the mail. You will need to take a kit to your local lab or doctor to have your blood drawn every month and mail to the Transplant Lab.     Call us with ANY CHANGES  Phone numbers - we must be able to reach you anytime of the day or night when a kidney is available  Address  Insurance coverage  Dialysis unit or kidney doctor  Alex: if you have surgery, stay in the hospital, have to get blood, or have an infection    Review your Kidney/Kidney-Pancreas Transplant Guide   This will give you detailed information about what happens when  you are on the waiting list   you are called when a kidney is available    The [facility] has a transplant surgeon and physician available 365 days a year, 24 hours a day to coordinate organ acceptance, procurement, surgical placement and to address urgent patient care  issues.  You will be notified in writing of any changes to our Transplant Centers staffing plan that would impact your ability to receive a transplant.        Attached is a letter from the [employer] Organ Sharing (UNOS). It describes the services and information offered to patients by UNOS and the Organ Procurement and Transplant Network. We look forward to working with you while on the waiting list.     We would like to inform you of an important OPTN (Organ Procurement and Transplant Network) Policy change that may affect the waiting time for some candidates on our waiting list.     Waiting time is important in identifying who receives offers for kidneys. A long wait time may increase your chance of getting an offer. Wait time is based on a test called eGFR that tells how well your kidneys are working. Wait time could also be based on how long you have been on dialysis. Government and health officials have changed the way this test is used. Before this year, hospitals used an eGFR that would include your race. For Black or  Americans, this eGFR could have shown that their kidneys were working better than they were.    Because of this change, we are looking at everyones record and assessing waiting time for people who are eligible. We will be reviewing everyones medical records and will contact you if you are eligible.     Who can I talk to if I have a question?  You can contact us if you have questions or send a message through [facility].     Please give us time to answer your questions. We are working on this for many patients.    How can I learn more about eGFR and this policy change?  Go to OPTN website > Patients > Kidney > FAQ: Understanding race-neutral eGFR calculations  Full URL: https://optn.transplant.hrsa.gov/patients/by-organ/kidney/understanding-the-proposal-to-require-race-neutral-egfr-calculations/  Call the Organ Procurement and Transplantation Network (OPTN) toll-free patient  services line at 1-827.891.5892    Congratulations,    Your Transplant Partner  [facility]   [facility] [address] [facility] [address] [place] [address]  [phone]  lh/enclosure    CC:  Simi Monsalve MD                                               The Organ Procurement and Transplantation Network   Toll-free patient services line: [phone]  Your resource for organ transplant information      Staffed 8:30 am - 5:00 pm ET Monday - Friday   Leave a message 24/7 to receive a call back    The Organ Procurement and Transplantation Network (OPTN) is the national transplant system. It makes the policies that decide how donated organs are matched to patients waiting for a transplant. The OPTN:    Makes sure donated organs get matched to people on the transplant waiting list  Tells people about the donation and transplant processes  Makes sure that the public knows about the need for more organ and tissue donations    The OPTN has a free patient services line that you can call to:  Get more information about:   o Organ donation and organ transplants   o Donation and transplant policies  Get an information kit with:   o A list of transplant hospitals   o Waiting list information  Talk about any questions you may have about your transplant hospital or organ procurement organization. The staff will do their best to help you or point you to others who may help.  Find out how you can volunteer with the OPTN and help shape transplant policy    The patient services line number is: 7-910-659-8639    Patient services line staff CANNOT answer questions about your own medical care, including:  Waiting list status  Test results  Medical records  You will need to call your transplant hospital for this information.    The following websites have more information about transplantation and donation:  OPTN: https://optn.transplant.[facility].gov/  For potential living donors and transplant recipients:   o Living donation  process: https://optn.transplant.hrsa.gov/living-donation/     o Financial assistance: https://www.livingdonorassistance.org/  Transplantation data: https://www.srtr.org/  Organ donation: https://www.organdonor.gov/    Volunteer with the OPTN: https://optn.transplant.hrsa.gov/get-involved

## 2025-05-01 NOTE — TELEPHONE ENCOUNTER
"KIDNEY WAIT LISTING NOTE    Date of Financial clearance to list: 25    SSN/[facility]:     Organ: Kidney    Last Name: Jhonatan  First Name: Casper    : 1970       Gender: female        MRN#: [id number]                                   State of Permanent Residence: [address] [place] [address] [address] [place] [address]    Ethnicity: Not  or /a   Race:      Black or     CLINICAL INFORMATION   Candidate Medical Urgency Status: Active (1)  Number of Previous Kidney Transplants: 0  Number of Previous Solid Organ Transplants: 0  Did you enter number of previous kidney or other solid organ transplants? Yes  Is this Candidate a Prior Living Donor: No  (If yes, please generate letter to UN[employer] with patient's date of donation, recipient SSN, signed by Surgical Director after patient is listed in order to receive priority points).      ABO  ABO Blood Group:   O POS     ABO Confirmation: (THESE DATES MUST BE PRIOR TO THE LIST DATE AND SUPPORTED BY SEPARATE LAB REPORTS)    Internal Results    Lab Results   Component Value Date    GROUPTRH O POS 2024     No results found for: "ABO"    External Results    ABO Date 1: 24   ABO Date 2  Are either of these ABO results based on External Labs? Yes  (If Yes, STOP and go to source document in Media Tab for verification).    VITALS  Height:  5' 2.4"  Weight:  75.3 kg  (Use height from Transplant clinic visits only).  Did you enter height/weight? Yes    HLA    Class I:  Lab Results   Component Value Date    IXYD2KV 23 2024    PBHO9ZD 74 2024    EFEE5XK 45 2024    HIGV4NU 53 2024    ANRJB1UT 4 2024    XILSP9JG 6 2024    NMLWW6UF 12 2024    MKKWD1RA 16 2024       Class II:  Lab Results   Component Value Date    NZRPTJ46QS 10 2024    XGMSJI06IM 15 2024    OXTPXQ818HF 51 2024    JLOGUJ4949 XX 2024    GUDWB6JI 5 2024    AAAMD6IX 6 2024       Tested for HLA Antibodies: Yes, " "antibodies detected     If result is "Positive" antibodies are detected     If result is "Negative or questionable" no antibodies detected    No results found for: "CIPRAS", "CIIPRAS"    DIALYSIS INFORMATION  Is patient Pre-Dialysis: Yes     GFR Information  Report GFR being used as the criteria for placement on the kidney list. If not, leave blank  GFR < or = 20 ml/min? Yes  If Yes, Specify value  _13.8__   ml/min     Initial date GFR became 20 or less: 7/11/24  Is GFR obtained from an Outside lab Result?no  (If YES verify with source document scanned into media)    If patient on Dialysis:    Is candidate currently on dialysis for ESRD? No  If Yes,  Date Chronic Dialysis Started:   (Not currently on dialysis)  (verify with source document in Media Tab)   Dialysis Unit Name: (Not currently on dialysis)                        Physician Name:  Dr. Lilly Mota  NPI#: [id number]    DIABETES INFORMATION  Primary Native Kidney Diagnosis: Diabetes Mellitus - Type II  C-Peptide Value - No results found for: "CPEPTIDE"  Current Diabetes Status: Type II diabetes    FOR NON-KIDNEY DEPARTMENT USE ONLY:  Additional Organs Registered? none    Maximum Acceptable Number of HLA Mismatches  ABDR:     6      (0-6)               AB:               (0-4)  ADR:   _____  (0-4)              BDR: _____ (0-4)  A:        _____  (0-2)              B:      _____ (0-2)          DR: ______ (0-2)    Will Recipient Accept?   Accept HBcAB Positive Organ:            Yes  Accept HBV ELICEO Positive Organ:        No  Accept HCV Antibody Positive Organ: Yes   Accept HCV ELICEO Positive Organ: Yes    Dual Kidney and En Bloc Opt In : No  Dual  Local:   yes  Dual Import:   yes  En Bloc Local:   yes  En Bloc Import: yes     Accept KDPI > 85: Single: yes     Local: yes     Import: yes    Accept KDPI > 85: Dual: yes     Local: yes     Import: yes    ### NURSE TO VERIFY CONSENT AND MAKE ANY NECESSARY CHANGES NEEDED IN UNET AT THE TIME OF VERIFICATION " ###    Unacceptible Antigens  If yes, list     Lab Results   Component Value Date    CB8ZFPJ Cw18 02/12/2025    CIIAB Negative 07/11/2024       ### DO NOT LIST IF ANTIGEN VALUE WEAK ###    eGFR Wait Time Modification    Based on Race Black or  does patient qualify for lab review? Yes      If yes, were qualifying SPAN 20 labs found? No      If found, generate eGFR Wait Time Modification Form and scan into Media.   Send patient letter when wait time is granted.     Hep B Vaccine series completed: no    Blood Type x2 was verified by myself and  Elizabeth Padilla RN.  Blood type determination and reporting was completed according to the programs protocols and OPTN requirements.    Left vm message for pt stating that she is now active on the kidney transplant waitlist, request call back to schedule monthly HLA labs.  Will notify the lab to send HLA tubes to pt's home address, pt will [facility] for the lab draw.

## 2025-05-06 DIAGNOSIS — N18.6 END STAGE RENAL DISEASE: Primary | ICD-10-CM

## 2025-05-06 DIAGNOSIS — Z76.82 ORGAN TRANSPLANT CANDIDATE: ICD-10-CM

## 2025-05-07 ENCOUNTER — PATIENT MESSAGE (OUTPATIENT)
Dept: TRANSPLANT | Facility: CLINIC | Age: 55
End: 2025-05-07
Payer: COMMERCIAL

## 2025-05-07 DIAGNOSIS — Z01.818 OTHER SPECIFIED PRE-OPERATIVE EXAMINATION: Primary | ICD-10-CM

## 2025-05-07 DIAGNOSIS — N18.4 CHRONIC KIDNEY DISEASE, STAGE IV (SEVERE): ICD-10-CM

## 2025-05-07 DIAGNOSIS — N18.4 CHRONIC KIDNEY DISEASE, STAGE IV (SEVERE): Primary | ICD-10-CM

## 2025-05-08 ENCOUNTER — TELEPHONE (OUTPATIENT)
Dept: TRANSPLANT | Facility: CLINIC | Age: 55
End: 2025-05-08
Payer: COMMERCIAL

## 2025-05-08 DIAGNOSIS — Z76.82 AWAITING ORGAN TRANSPLANT: Primary | ICD-10-CM

## 2025-05-08 DIAGNOSIS — Z76.82 ORGAN TRANSPLANT CANDIDATE: ICD-10-CM

## 2025-05-08 DIAGNOSIS — N18.6 END STAGE RENAL DISEASE: ICD-10-CM

## 2025-05-09 ENCOUNTER — TELEPHONE (OUTPATIENT)
Dept: TRANSPLANT | Facility: CLINIC | Age: 55
End: 2025-05-09
Payer: COMMERCIAL

## 2025-05-09 NOTE — LETTER
May 9, 2025    Casper Israel  [address] [place] [address]        Dear Casper Jhonatan:  MRN: [id number]  : 1970    You are scheduled on 2025 for follow up in the transplant clinic to update your records and evaluate your health status as you wait for a transplant.  It is very important that we ensure you are ready for your transplant.      Please make arrangements to be in our transplant clinic from 12:30 pm- 4 pm.  During this time you will watch an educational video and then be seen by our transplant , financial counselor, and advance practice provider.     If you have had a change in your medical history since your last visit, please call your transplant coordinator to ensure we have the medical records to review prior to your appointment.     Please bring the following with you to this appointment:  A Caregiver is REQUIRED  Bring ALL insurance information including medication card  Current list of medications  Copies of any outside medical records from the past year, such as: mammogram, pap smear, ultrasound, CAT scan, colonoscopy, cardiac angiogram or cardiac stress test    To reschedule your appointments please call [phone] or [phone] (ext. [phone]). Please ask for the .      Failure to cancel in advance or arrive on time could result in a $50 cancellation fee.  Your transplant candidacy could be affected by no showing these appointments.  We look forward to seeing you.    Sincerely,    [facility] [place]  [address] [place] [address]  [phone]

## 2025-05-12 PROCEDURE — 86833 HLA CLASS II HIGH DEFIN QUAL: CPT | Mod: TXP | Performed by: NURSE PRACTITIONER

## 2025-05-12 PROCEDURE — 86832 HLA CLASS I HIGH DEFIN QUAL: CPT | Mod: TXP | Performed by: NURSE PRACTITIONER

## 2025-05-12 PROCEDURE — 36415 COLL VENOUS BLD VENIPUNCTURE: CPT | Mod: TXP

## 2025-05-15 ENCOUNTER — PATIENT MESSAGE (OUTPATIENT)
Dept: TRANSPLANT | Facility: CLINIC | Age: 55
End: 2025-05-15
Payer: COMMERCIAL

## 2025-05-19 ENCOUNTER — LAB VISIT (OUTPATIENT)
Dept: LAB | Facility: HOSPITAL | Age: 55
End: 2025-05-19
Payer: COMMERCIAL

## 2025-05-19 DIAGNOSIS — Z76.82 AWAITING ORGAN TRANSPLANT: ICD-10-CM

## 2025-05-20 ENCOUNTER — PATIENT MESSAGE (OUTPATIENT)
Dept: TRANSPLANT | Facility: CLINIC | Age: 55
End: 2025-05-20
Payer: COMMERCIAL

## 2025-05-21 LAB — HPRA INTERPRETATION: NORMAL

## 2025-05-22 RX ORDER — ASPIRIN 81 MG/1
1 TABLET ORAL
COMMUNITY

## 2025-05-22 RX ORDER — CHOLECALCIFEROL (VITAMIN D3) 25 MCG
1000 TABLET ORAL
COMMUNITY

## 2025-05-23 ENCOUNTER — TELEPHONE (OUTPATIENT)
Dept: TRANSPLANT | Facility: CLINIC | Age: 55
End: 2025-05-23
Payer: COMMERCIAL

## 2025-05-29 NOTE — H&P (VIEW-ONLY)
"      [facility] Vascular - Clinic Note  Magdaleno Fiore MD      Patient Name: Casper Israel                   : 1970     MRN: [id number]   Visit Date: 2025          History Present Illness     Reason for Visit: Dialysis Access Evaluation    Ms. Israel presents to the clinic in need of permanent hemodialysis access creation. She is being referred by her nephrologist Dr. Monsalve. She is not on hemodialysis at this time. Most recent GFR is 11. Vein mapping of her left arm obtained today as she is right handed. She denies pacemaker/ICD or history of mediport. Last A1C was 5.8.      REVIEW OF SYSTEMS:  12 point review of systems conducted, negative except as stated in the history of present illness. See HPI for details.        Physical Exam      Vitals:    25 1142 25 1144   BP: (!) 144/94 137/84   BP Location: Left arm Right arm   Patient Position: Sitting Sitting   Pulse: 68 66   Weight: 72.6 kg (160 lb)    Height: 5' 2" (1.575 m)         General: well-nourished, no acute distress, and healthy appearing, alert, pleasant, conversant, and oriented  Neurologic: cranial nerves are grossly intact, no neurologic deficits, no motor deficits, and no sensory deficits  Neck/Chest: normal , soft without lymphadenopathy, and no carotid bruits noted  Respiratory: breathing easily, without respiratory distress, and normal breath sounds  Abdomen: normal and soft  Cardiology: regular rate and rhythm and no audible murmur    Upper Extremity Arterial Exam:   Right - radial is palpable and brachial is palpable  Left - radial is palpable and brachial is palpable      Musculoskeletal:   Upper Extremity: normal bilateral hand function          Assessment and Plan     Ms. Israel is a 55 y.o. with chronic kidney disease who needs permanent access creation. She is not currently on dialysis, however most recent GFR is 11. There is no history of pacemaker/ICD or mediport. Vein mapping obtained today demonstrates anatomy that is not " suitable for a fistula creation. Recommend LEFT ARM ACCESS CREATION- LIKELY ARM GRAFT PLACEMENT. We discussed the risks and benefits of surgery at length including the risks of bleeding, infection, failure of access to mature, neurovascular injury, and/or steal syndrome. She wishes to proceed.        1. Chronic kidney disease, stage IV (severe)  - Ambulatory referral/consult to Vascular Surgery  - Basic Metabolic Panel; Future  - CBC Auto Differential; Future  - EKG 12-lead; Future  - X-Ray Chest PA And Lateral; Future           Imaging Obtained/Reviewed   Study: left upper extremity vein mapping  Date:   2025           Medical History     Past Medical History:   Diagnosis Date    Allergic rhinitis due to pollen     Anemia     CKD (chronic kidney disease)     Costochondritis     Depression     Diabetes mellitus, type 2     Diabetic nephropathy     Disorder of kidney and ureter     Fibroids     Focal glomerular sclerosis     GERD (gastroesophageal reflux disease)     History of lump in breast     Left    Hyperlipidemia     Hypertension     Ischemic heart disease     Nicotine dependence     Palpitations     Primigravida of advanced maternal age     Sleep apnea, unspecified      Past Surgical History:   Procedure Laterality Date    BREAST SURGERY       SECTION      CHOLECYSTECTOMY      HYSTERECTOMY      LAPAROSCOPIC TOTAL HYSTERECTOMY      RENAL BIOPSY       Family History   Problem Relation Name Age of Onset    Sarcoidosis Mother      Heart disease Father      Heart attack Father      COPD Sister      Lupus Sister      Kidney disease Brother       Social History[1]  Current Outpatient Medications   Medication Instructions    allopurinoL (ZYLOPRIM) 100 mg, Daily    aspirin (ECOTRIN) 81 MG EC tablet 1 tablet, Oral    atorvastatin (LIPITOR) 40 mg, Daily    bisoprolol (ZEBETA) 2.5 mg, Daily    clobetasol 0.05% (TEMOVATE) 1 g, 2 times daily    dapagliflozin propanediol (FARXIGA) 5 mg, Daily    diltiaZEM  (CARDIZEM CD) 240 mg, Daily    doxazosin (CARDURA) 4 mg, Nightly    furosemide (LASIX) 20 mg, Oral, Daily PRN    multivitamin with minerals tablet 1 tablet, Daily    olmesartan (BENICAR) 40 mg, Daily    omega-3 fatty acids/fish oil (FISH OIL-OMEGA-3 FATTY ACIDS) 300-1,000 mg capsule 2 capsules, Daily    pantoprazole (PROTONIX) 40 mg, Daily PRN    vitamin D (VITAMIN D3) 1,000 Units     Review of patient's allergies indicates:   Allergen Reactions    Penicillins Hives and Itching       Patient Care Team:  Dimple Montesinos MD as PCP - General (Family Medicine)  Simi Monsalve MD (Nephrology)  Magdaleno Fiore MD as Vascular Surgery (Vascular Surgery)  Yamile Trejo DO (Internal Medicine)  Ernie Forrest MD as Consulting Physician (Cardiology)      No follow-ups on file. In addition to their scheduled follow up, the patient has also been instructed to follow up on as needed basis.     Future Appointments   Date Time Provider Department Center   2025  9:00 AM [facility] OIC-XRAY [facility] XRAY [facility]   2025  9:30 AM [facility] OIC-US1 MASTER [facility] ULTR [facility]   [facility]   2025 12:00 PM LAB, APPOINTMENT [facility]   2025 12:30 PM KIDNEY LISTED, TRANSPLANT [facility] KIDNTX Gato Gupta             [1]   Social History  Socioeconomic History    Marital status:    Tobacco Use    Smoking status: Former     Current packs/day: 0.00     Types: Cigarettes, Vaping with nicotine     Quit date: 3/1/2024     Years since quittin.2    Smokeless tobacco: Never   Substance and Sexual Activity    Alcohol use: Not Currently    Drug use: Never    Sexual activity: Not Currently     Social Drivers of Health     Financial Resource Strain: Low Risk  (2024)    Received from [facility]    Overall Financial Resource Strain (CARDIA)     Difficulty of Paying Living Expenses: Not hard at all   Food Insecurity: No Food Insecurity (2025)     Received from [facility]    Hunger Vital Sign     Worried About Running Out of Food in the Last Year: Never true     Ran Out of Food in the Last Year: Never true   Transportation Needs: No Transportation Needs (12/2/2024)    [facility]    PRAPARE - Transportation     Lack of Transportation (Medical): No     Lack of Transportation (Non-Medical): No   Physical Activity: Insufficiently Active (12/2/2024)    Received from [facility]    Exercise Vital Sign     Days of Exercise per Week: 1 day     Minutes of Exercise per Session: 20 min   Stress: No Stress Concern Present (12/2/2024)    Received from [facility]    [employer] [place] [employer] - Occupational Stress Questionnaire     Feeling of Stress : Not at all

## 2025-06-02 ENCOUNTER — OFFICE VISIT (OUTPATIENT)
Dept: VASCULAR SURGERY | Facility: CLINIC | Age: 55
End: 2025-06-02
Attending: INTERNAL MEDICINE
Payer: COMMERCIAL

## 2025-06-02 VITALS
DIASTOLIC BLOOD PRESSURE: 84 MMHG | SYSTOLIC BLOOD PRESSURE: 137 MMHG | WEIGHT: 160 LBS | HEART RATE: 66 BPM | HEIGHT: 62 IN | BODY MASS INDEX: 29.44 KG/M2

## 2025-06-02 DIAGNOSIS — N18.4 CHRONIC KIDNEY DISEASE, STAGE IV (SEVERE): Primary | ICD-10-CM

## 2025-06-02 PROCEDURE — 3044F HG A1C LEVEL LT 7.0%: CPT | Mod: CPTII,NTX,, | Performed by: SURGERY

## 2025-06-02 PROCEDURE — 1160F RVW MEDS BY RX/DR IN RCRD: CPT | Mod: CPTII,NTX,, | Performed by: SURGERY

## 2025-06-02 PROCEDURE — 4010F ACE/ARB THERAPY RXD/TAKEN: CPT | Mod: CPTII,NTX,, | Performed by: SURGERY

## 2025-06-02 PROCEDURE — 3075F SYST BP GE 130 - 139MM HG: CPT | Mod: CPTII,NTX,, | Performed by: SURGERY

## 2025-06-02 PROCEDURE — 1159F MED LIST DOCD IN RCRD: CPT | Mod: CPTII,NTX,, | Performed by: SURGERY

## 2025-06-02 PROCEDURE — 3079F DIAST BP 80-89 MM HG: CPT | Mod: CPTII,NTX,, | Performed by: SURGERY

## 2025-06-02 PROCEDURE — 3008F BODY MASS INDEX DOCD: CPT | Mod: CPTII,NTX,, | Performed by: SURGERY

## 2025-06-02 PROCEDURE — 99203 OFFICE O/P NEW LOW 30 MIN: CPT | Mod: NTX,,, | Performed by: SURGERY

## 2025-06-02 RX ORDER — SODIUM CHLORIDE 9 MG/ML
INJECTION, SOLUTION INTRAVENOUS CONTINUOUS
OUTPATIENT
Start: 2025-06-02

## 2025-06-09 ENCOUNTER — PATIENT MESSAGE (OUTPATIENT)
Dept: TRANSPLANT | Facility: CLINIC | Age: 55
End: 2025-06-09
Payer: COMMERCIAL

## 2025-06-10 PROCEDURE — 36415 COLL VENOUS BLD VENIPUNCTURE: CPT | Mod: TXP

## 2025-06-16 ENCOUNTER — LAB VISIT (OUTPATIENT)
Dept: LAB | Facility: HOSPITAL | Age: 55
End: 2025-06-16
Payer: COMMERCIAL

## 2025-06-16 DIAGNOSIS — Z76.82 AWAITING ORGAN TRANSPLANT: ICD-10-CM

## 2025-06-27 ENCOUNTER — HOSPITAL ENCOUNTER (OUTPATIENT)
Facility: HOSPITAL | Age: 55
Discharge: HOME | End: 2025-06-27
Attending: SURGERY | Admitting: SURGERY
Payer: COMMERCIAL

## 2025-06-27 ENCOUNTER — ANESTHESIA EVENT (OUTPATIENT)
Dept: SURGERY | Facility: HOSPITAL | Age: 55
End: 2025-06-27
Payer: COMMERCIAL

## 2025-06-27 ENCOUNTER — ANESTHESIA (OUTPATIENT)
Dept: SURGERY | Facility: HOSPITAL | Age: 55
End: 2025-06-27
Payer: COMMERCIAL

## 2025-06-27 DIAGNOSIS — N18.4 CHRONIC KIDNEY DISEASE, STAGE IV (SEVERE): Primary | ICD-10-CM

## 2025-06-27 LAB
ANION GAP SERPL CALC-SCNC: 15 MMOL/L (ref 8–16)
BUN SERPL-MCNC: 99 MG/DL (ref 6–30)
CHLORIDE SERPL-SCNC: 111 MMOL/L (ref 95–110)
CREAT SERPL-MCNC: 5.5 MG/DL (ref 0.5–1.4)
GLUCOSE SERPL-MCNC: 107 MG/DL (ref 70–110)
HCT VFR BLD CALC: 36 %PCV (ref 36–54)
HGB BLD-MCNC: 12 G/DL
POC IONIZED CALCIUM: 1.31 MMOL/L (ref 1.06–1.42)
POC TCO2 (MEASURED): 18 MMOL/L (ref 23–29)
POTASSIUM BLD-SCNC: 5.5 MMOL/L (ref 3.5–5.1)
SAMPLE: ABNORMAL
SODIUM BLD-SCNC: 139 MMOL/L (ref 136–145)

## 2025-06-27 PROCEDURE — 25000003 PHARM REV CODE 250: Mod: TXP | Performed by: SURGERY

## 2025-06-27 PROCEDURE — 36000706: Mod: NTX | Performed by: SURGERY

## 2025-06-27 PROCEDURE — 63600175 PHARM REV CODE 636 W HCPCS: Mod: TXP | Performed by: NURSE ANESTHETIST, CERTIFIED REGISTERED

## 2025-06-27 PROCEDURE — 27201423 OPTIME MED/SURG SUP & DEVICES STERILE SUPPLY: Mod: TXP | Performed by: SURGERY

## 2025-06-27 PROCEDURE — 37000008 HC ANESTHESIA 1ST 15 MINUTES: Mod: NTX | Performed by: SURGERY

## 2025-06-27 PROCEDURE — 71000016 HC POSTOP RECOV ADDL HR: Mod: NTX | Performed by: SURGERY

## 2025-06-27 PROCEDURE — 71000015 HC POSTOP RECOV 1ST HR: Mod: TXP | Performed by: SURGERY

## 2025-06-27 PROCEDURE — 63600175 PHARM REV CODE 636 W HCPCS: Mod: TXP | Performed by: ANESTHESIOLOGY

## 2025-06-27 PROCEDURE — 25000003 PHARM REV CODE 250: Mod: TXP | Performed by: NURSE ANESTHETIST, CERTIFIED REGISTERED

## 2025-06-27 PROCEDURE — 36000707: Mod: TXP | Performed by: SURGERY

## 2025-06-27 PROCEDURE — 63600175 PHARM REV CODE 636 W HCPCS: Mod: TXP | Performed by: SURGERY

## 2025-06-27 PROCEDURE — 37000009 HC ANESTHESIA EA ADD 15 MINS: Mod: NTX | Performed by: SURGERY

## 2025-06-27 RX ORDER — MIDAZOLAM HYDROCHLORIDE 2 MG/2ML
2 INJECTION, SOLUTION INTRAMUSCULAR; INTRAVENOUS ONCE
Status: COMPLETED | OUTPATIENT
Start: 2025-06-27 | End: 2025-06-27

## 2025-06-27 RX ORDER — HEPARIN SODIUM 5000 [USP'U]/ML
INJECTION, SOLUTION INTRAVENOUS; SUBCUTANEOUS
Status: COMPLETED
Start: 2025-06-27 | End: 2025-06-27

## 2025-06-27 RX ORDER — ROPIVACAINE HYDROCHLORIDE 5 MG/ML
INJECTION, SOLUTION EPIDURAL; INFILTRATION; PERINEURAL
Status: COMPLETED | OUTPATIENT
Start: 2025-06-27 | End: 2025-06-27

## 2025-06-27 RX ORDER — HYDROMORPHONE HYDROCHLORIDE 2 MG/ML
0.2 INJECTION, SOLUTION INTRAMUSCULAR; INTRAVENOUS; SUBCUTANEOUS EVERY 5 MIN PRN
Refills: 0 | OUTPATIENT
Start: 2025-06-27

## 2025-06-27 RX ORDER — LIDOCAINE HYDROCHLORIDE 10 MG/ML
INJECTION, SOLUTION EPIDURAL; INFILTRATION; INTRACAUDAL; PERINEURAL
Status: DISCONTINUED | OUTPATIENT
Start: 2025-06-27 | End: 2025-06-27

## 2025-06-27 RX ORDER — PROTAMINE SULFATE 10 MG/ML
INJECTION, SOLUTION INTRAVENOUS
Status: DISCONTINUED
Start: 2025-06-27 | End: 2025-06-27 | Stop reason: WASHOUT

## 2025-06-27 RX ORDER — PROPOFOL 10 MG/ML
VIAL (ML) INTRAVENOUS CONTINUOUS PRN
Status: DISCONTINUED | OUTPATIENT
Start: 2025-06-27 | End: 2025-06-27

## 2025-06-27 RX ORDER — LIDOCAINE HYDROCHLORIDE 10 MG/ML
INJECTION, SOLUTION EPIDURAL; INFILTRATION; INTRACAUDAL; PERINEURAL
Status: DISCONTINUED | OUTPATIENT
Start: 2025-06-27 | End: 2025-06-27 | Stop reason: HOSPADM

## 2025-06-27 RX ORDER — BUPIVACAINE HYDROCHLORIDE 2.5 MG/ML
INJECTION, SOLUTION EPIDURAL; INFILTRATION; INTRACAUDAL; PERINEURAL
Status: DISCONTINUED | OUTPATIENT
Start: 2025-06-27 | End: 2025-06-27 | Stop reason: HOSPADM

## 2025-06-27 RX ORDER — HYDROCODONE BITARTRATE AND ACETAMINOPHEN 5; 325 MG/1; MG/1
1 TABLET ORAL EVERY 6 HOURS PRN
Qty: 20 TABLET | Refills: 0 | Status: SHIPPED | OUTPATIENT
Start: 2025-06-27

## 2025-06-27 RX ORDER — EPHEDRINE SULFATE 50 MG/ML
INJECTION, SOLUTION INTRAVENOUS
Status: DISCONTINUED | OUTPATIENT
Start: 2025-06-27 | End: 2025-06-27

## 2025-06-27 RX ORDER — VANCOMYCIN HYDROCHLORIDE 1 G/20ML
1000 INJECTION, POWDER, LYOPHILIZED, FOR SOLUTION INTRAVENOUS
Status: DISCONTINUED | OUTPATIENT
Start: 2025-06-27 | End: 2025-06-27 | Stop reason: HOSPADM

## 2025-06-27 RX ORDER — BUPIVACAINE HYDROCHLORIDE 5 MG/ML
INJECTION, SOLUTION EPIDURAL; INTRACAUDAL; PERINEURAL
Status: DISCONTINUED
Start: 2025-06-27 | End: 2025-06-27 | Stop reason: HOSPADM

## 2025-06-27 RX ORDER — ROPIVACAINE HYDROCHLORIDE 5 MG/ML
20 INJECTION, SOLUTION EPIDURAL; INFILTRATION; PERINEURAL ONCE
Status: DISCONTINUED | OUTPATIENT
Start: 2025-06-27 | End: 2025-06-27 | Stop reason: HOSPADM

## 2025-06-27 RX ORDER — LIDOCAINE HYDROCHLORIDE 10 MG/ML
INJECTION, SOLUTION INFILTRATION; PERINEURAL
Status: DISCONTINUED
Start: 2025-06-27 | End: 2025-06-27 | Stop reason: HOSPADM

## 2025-06-27 RX ORDER — ONDANSETRON HYDROCHLORIDE 2 MG/ML
4 INJECTION, SOLUTION INTRAVENOUS DAILY PRN
OUTPATIENT
Start: 2025-06-27

## 2025-06-27 RX ORDER — OXYCODONE AND ACETAMINOPHEN 5; 325 MG/1; MG/1
1 TABLET ORAL
Refills: 0 | OUTPATIENT
Start: 2025-06-27

## 2025-06-27 RX ORDER — HYDROCODONE BITARTRATE AND ACETAMINOPHEN 5; 325 MG/1; MG/1
1 TABLET ORAL EVERY 4 HOURS PRN
Status: DISCONTINUED | OUTPATIENT
Start: 2025-06-27 | End: 2025-06-27 | Stop reason: HOSPADM

## 2025-06-27 RX ORDER — HEPARIN SODIUM 5000 [USP'U]/ML
INJECTION, SOLUTION INTRAVENOUS; SUBCUTANEOUS
Status: DISCONTINUED | OUTPATIENT
Start: 2025-06-27 | End: 2025-06-27 | Stop reason: HOSPADM

## 2025-06-27 RX ORDER — VANCOMYCIN HCL IN 5 % DEXTROSE 1G/250ML
1000 PLASTIC BAG, INJECTION (ML) INTRAVENOUS
Status: DISCONTINUED | OUTPATIENT
Start: 2025-06-27 | End: 2025-06-27

## 2025-06-27 RX ORDER — SODIUM CHLORIDE 9 MG/ML
INJECTION, SOLUTION INTRAVENOUS CONTINUOUS
Status: DISCONTINUED | OUTPATIENT
Start: 2025-06-27 | End: 2025-06-27 | Stop reason: HOSPADM

## 2025-06-27 RX ADMIN — EPHEDRINE SULFATE 10 MG: 50 INJECTION INTRAVENOUS at 11:06

## 2025-06-27 RX ADMIN — PROPOFOL 50 MCG/KG/MIN: 10 INJECTION, EMULSION INTRAVENOUS at 09:06

## 2025-06-27 RX ADMIN — ROPIVACAINE HYDROCHLORIDE 30 ML: 5 INJECTION, SOLUTION EPIDURAL; INFILTRATION; PERINEURAL at 08:06

## 2025-06-27 RX ADMIN — HEPARIN SODIUM 5000 UNITS: 5000 INJECTION, SOLUTION INTRAVENOUS; SUBCUTANEOUS at 10:06

## 2025-06-27 RX ADMIN — SODIUM CHLORIDE: 9 INJECTION, SOLUTION INTRAVENOUS at 09:06

## 2025-06-27 RX ADMIN — LIDOCAINE HYDROCHLORIDE 20 MG: 10 INJECTION, SOLUTION EPIDURAL; INFILTRATION; INTRACAUDAL; PERINEURAL at 09:06

## 2025-06-27 RX ADMIN — MIDAZOLAM HYDROCHLORIDE 2 MG: 1 INJECTION, SOLUTION INTRAMUSCULAR; INTRAVENOUS at 08:06

## 2025-06-27 RX ADMIN — VANCOMYCIN HYDROCHLORIDE 1000 MG: 1 INJECTION, POWDER, LYOPHILIZED, FOR SOLUTION INTRAVENOUS at 08:06

## 2025-06-27 NOTE — ANESTHESIA PREPROCEDURE EVALUATION
06/27/2025  Casper Israel is a 55 y.o., female.      Pre-op Assessment    I have reviewed the Patient Summary Reports.     I have reviewed the Nursing Notes. I have reviewed the NPO Status.      Review of Systems  Anesthesia Hx:  No problems with previous Anesthesia                Social:  Non-Smoker, No Alcohol Use       Hematology/Oncology:  Hematology Normal   Oncology Normal                                   EENT/Dental:  EENT/Dental Normal           Cardiovascular:     Hypertension, well controlled                                          Pulmonary:        Sleep Apnea                Renal/:  Chronic Renal Disease, CKD                Hepatic/GI:     GERD                Musculoskeletal:  Musculoskeletal Normal                Neurological:  Neurology Normal                                      Endocrine:  Endocrine Normal            Dermatological:  Skin Normal    Psych:  Psychiatric History                  Physical Exam  General: Well nourished and Alert    Airway:  Mallampati: II / II  Mouth Opening: Normal  TM Distance: Normal  Tongue: Normal  Neck ROM: Normal ROM    Dental:  Intact        Anesthesia Plan  Type of Anesthesia, risks & benefits discussed:    Anesthesia Type: Regional  Intra-op Monitoring Plan: Standard ASA Monitors  Post Op Pain Control Plan: IV/PO Opioids PRN  Induction:  IV  Airway Plan: Direct  Informed Consent: Informed consent signed with the Patient and all parties understand the risks and agree with anesthesia plan.  All questions answered.   ASA Score: 3    Ready For Surgery From Anesthesia Perspective.     .  Lab Results   Component Value Date    WBC 6.29 07/11/2024    HGB 11.1 (L) 07/11/2024    HCT 34.4 (L) 07/11/2024    MCV 92 07/11/2024     07/11/2024         Chemistry        Component Value Date/Time     07/11/2024 0700    K 4.3 07/11/2024 0700     (H)  07/11/2024 0700    CO2 19 (L) 07/11/2024 0700    BUN 45 (H) 07/11/2024 0700    CREATININE 3.7 (H) 07/11/2024 0700     (H) 07/11/2024 0700        Component Value Date/Time    CALCIUM 10.1 07/11/2024 0700    ALKPHOS 120 07/11/2024 0700    AST 30 07/11/2024 0700    ALT 80 (H) 07/11/2024 0700    BILITOT 0.3 07/11/2024 0700

## 2025-06-27 NOTE — OP NOTE
[facility]  [place]ative Note        Surgery Date:  06/27/2025     Pre-op Diagnosis:    Chronic kidney disease     Post-op Diagnosis:  Same     Procedure(s): Creation of left upper extremity brachiocephalic fistula    Indication for procedure: Casper Israel is a 55 y.o. female who has a history of CKD.  She presents for creation of a long term access    Surgeon: Magdaleno Fiore MD    Assistant: Physician Assistant: Naif Arellano PA-C     Anesthesia:  MAC with regional block     Findings:  Ultrasound evaluation noted the cephalic vein to be of adequate caliber in the upper arm for fistula.  There was a good thrill on completion of the case    Complications: None     Estimated Blood Loss:  25 mL         Specimens: None    Implants:* No implants in log *    Drains: None    Procedure in detail: After informed consent was obtained, and risks and benefits discussed with the patient, she was brought to the operating room placed supine. After adequate anesthesia was obtained, the left upper extremity was prepped and draped in a standard sterile fashion. Ultrasound evaluation of the upper extremity was performed. An oblique incision was made in the antecubital fossa and dissection carried down until the cephalic vein and brachial artery were identified.  Each of these was dissected free from surrounding structures.  The artery was isolated proximally and distally with vessel loops.  Vein was then ligated and transected distally in the incision.  The artery was then occluded by placing the vessel loops under tension.  Longitudinal arteriotomy was made and localized heparin was injected proximally and distally into the artery.  The vein was then spatulated and an end-to-side anastomosis was performed with a 6-0 Prolene sutures.  The vein was flushed and flow restored to the arm and the fistula.  Initially I did not have good flow in the fistula.  I re-clamped and took down the anastomosis.  I extended my  arteriotomy and venotomy.  I then reanastomosed with a 6 0 Prolene suture.  At this point I restored flow to the fistula in the arm and a good thrill was identified.  The wound was then closed in layers using 3-0 Vicryl for a deep layer and a 4-0 Monocryl subcuticular on the skin.  Dermabond was placed.  The patient was transferred back to recovery in stable condition.  she tolerated the procedure well.    Condition: Good

## 2025-06-27 NOTE — PLAN OF CARE
Timeout at 0818 at bedside with Dr Orellana and OMAR Hurtado RN   Left SC block started at 0818, block complete at 0822  Pt tolerated well

## 2025-06-27 NOTE — DISCHARGE SUMMARY
[facility] Surgical - Periop Services  Discharge Note  Short Stay    Procedure(s) (LRB):  INSERTION, GRAFT, ARTERIOVENOUS // Left / supraclavicular block (Left)      OUTCOME: Patient tolerated treatment/procedure well without complication and is now ready for discharge.    DISPOSITION: Home or Self Care    FINAL DIAGNOSIS:  Chronic kidney disease, stage IV (severe)    FOLLOWUP: In clinic    DISCHARGE INSTRUCTIONS:    Discharge Procedure Orders   Diet general     Keep surgical extremity elevated     Lifting restrictions     No dressing needed     Wound care routine (specify)   Order Comments: Wound care routine: Keep the incision clean with mild soap and water. Do not submerge, however, showers are okay. Do not peel glue off; it will fall off on it's own within the next 7-10 days.     Call MD for:  temperature >100.4     Call MD for:  severe uncontrolled pain     Call MD for:  redness, tenderness, or signs of infection (pain, swelling, redness, odor or green/yellow discharge around incision site)     Activity as tolerated        TIME SPENT ON DISCHARGE: 5 minutes

## 2025-06-27 NOTE — ANESTHESIA POSTPROCEDURE EVALUATION
Anesthesia Post Evaluation    Patient: Casper Israel    Procedure(s) Performed: Procedure(s) (LRB):  CREATION, AV FISTULA; supraclavicular block (Left)    Final Anesthesia Type: MAC      Patient location during evaluation: OPS  Patient participation: Yes- Able to Participate  Level of consciousness: awake and alert and oriented  Post-procedure vital signs: reviewed and stable  Airway patency: patent    PONV status at discharge: No PONV  Anesthetic complications: no      Cardiovascular status: blood pressure returned to baseline  Respiratory status: unassisted  Hydration status: euvolemic  Follow-up not needed.              Vitals Value Taken Time   /80 06/27/25 11:31   Temp 36.8 06/27/25 11:31   Pulse 68 06/27/25 11:31   Resp 16 06/27/25 11:31   SpO2 97 06/27/25 11:31         No case tracking events are documented in the log.      Pain/Joel Score: No data recorded

## 2025-06-27 NOTE — TRANSFER OF CARE
"Anesthesia Transfer of Care Note    Patient: Casper Israel    Procedure(s) Performed: Procedure(s) (LRB):  CREATION, AV FISTULA; supraclavicular block (Left)    Patient location: OPS    Anesthesia Type: general    Transport from OR: Transported from OR on room air with adequate spontaneous ventilation    Post pain: adequate analgesia    Post assessment: no apparent anesthetic complications    Post vital signs: stable    Level of consciousness: awake, alert and oriented    Complications: none    Transfer of care protocol was followed      Last vitals: Visit Vitals  BP (!) 140/85   Pulse 62   Temp 36.8 °C (98.2 °F) (Oral)   Resp 16   Ht 5' 2" (1.575 m)   Wt 72 kg (158 lb 11.7 oz)   Breastfeeding No   BMI 29.03 kg/m²     "

## 2025-06-27 NOTE — ANESTHESIA PROCEDURE NOTES
Peripheral Block    Patient location during procedure: pre-op   Block not for primary anesthetic.  Reason for block: at surgeon's request and post-op pain management   Post-op Pain Location: Left arm   Start time: 6/27/2025 8:18 AM  Timeout: 6/27/2025 8:17 AM   End time: 6/27/2025 8:22 AM    Staffing  Authorizing Provider: Zain Orellana MD  Performing Provider: Zain Orellana MD    Staffing  Performed by: Zain Orellana MD  Authorized by: Zain Orellana MD    Preanesthetic Checklist  Completed: patient identified, IV checked, site marked, risks and benefits discussed, surgical consent, monitors and equipment checked, pre-op evaluation and timeout performed  Peripheral Block  Patient position: supine  Prep: ChloraPrep  Patient monitoring: heart rate, cardiac monitor, continuous pulse ox, continuous capnometry and frequent blood pressure checks  Block type: supraclavicular  Laterality: left  Injection technique: single shot  Needle  Needle type: Echogenic   Needle gauge: 22 G  Needle length: 4 in  Needle localization: ultrasound guidance     Assessment  Injection assessment: negative aspiration, negative parasthesia and local visualized surrounding nerve  Paresthesia pain: none  Heart rate change: no  Slow fractionated injection: yes    Medications:    Medications: ropivacaine (NAROPIN) injection 0.5% - Perineural   30 mL - 6/27/2025 8:19:00 AM    Additional Notes  VSS.  [facility] RN monitoring vitals throughout procedure.  Patient tolerated procedure well.

## 2025-06-27 NOTE — DISCHARGE INSTRUCTIONS
"Patient Education       Arteriovenous Fistula for Dialysis Discharge Instructions     About this topic   Patients with kidney failure are treated with dialysis. A special machine that acts like a kidney is used to wash harmful wastes from your blood. "Cleaned" blood then flows back into your body. This machine is hooked up to a vein in your body.  Doctors need access to your blood to do dialysis. One way for them to do this is by sewing an artery and a vein together to make a fistula. The medical name for this is an arteriovenous or AV fistula. Since it may take a few months for the fistula to heal and get stronger, surgery will be needed weeks to months before using this for dialysis.     What care is needed at home?   Do not wear anything tight on the fistula arm like watches, tight sleeves, or bracelets.  Take extra care not to hit your fistula arm on doorways, furniture, or other heavy, solid objects.  Do not carry anything heavy on the fistula arm like purses, bags, or children.  Do not let anyone except a dialysis nurse draw blood from your fistula arm.  Do not let anyone start an I.V. on your fistula arm.  Do not let anyone take a blood pressure on your fistula arm.  Wear your sling until the block wears off tomorrow.  Keep surgical extremity elevated.  Do not soak your incision under water. No tub baths, swimming, or hot tubs.   May take a shower. Wash incision gently, pat dry, then leave open to air. Do not pick or peel off dermabond (skin glue). This will help prevent infection.   Do not use ointments, creams, oils, and lotions on your incision.     What follow-up care is needed?   Be sure to keep your follow-up appointment.  You will be closely watched by healthcare staff during your treatments. Your fistula will be checked at each visit before dialysis is started.  A dialysis nurse will check for:  Signs of infection including warmth or redness of the skin, swelling of the arm with the fistula, drainage " "or open sores on the fistula arm  Blood flow ? The dialysis nurse will feel your arm for the vibration or "thrill". This happens as blood flows through the fistula. The nurse will use a stethoscope to listen to the sound of the blood going through the fistula. This is called the bruit.    What problems could happen?   Infection  Blood clot that limits blood flow. If blood clots happen often, you may need a new fistula or the clots may have to be removed.  Drainage lasts longer than 48 hours  Coldness and pain in the hand or arm (needs care right away)  Numbness in the fingers  Swelling or bulge at the access site  Fistula does not work right  Fistula may fail to develop or mature  Bleeding    When do I need to call the doctor?   Signs of infection. These include a fever of 100.4°F (38°C) or higher, chills.  Signs of wound infection. These include swelling, redness, warmth around the wound; too much pain when touched; yellowish, greenish, or bloody discharge; foul smell coming from the cut site; cut site opens up.  Pain at the site  Coldness or pain in the hand or arm  Bleeding from your fistula that does not stop after 20 minutes of gentle pressure  You do not feel the vibration or "thrill" as blood flows through the fistula   "

## 2025-06-27 NOTE — INTERVAL H&P NOTE
The patient has been examined and the H&P has been reviewed:    I concur with the findings and no changes have occurred since H&P was written.    Surgery risks, benefits and alternative options discussed and understood by patient/family.          Active Hospital Problems    Diagnosis  POA    *Chronic kidney disease, stage IV (severe) [N18.4]  Yes      Resolved Hospital Problems   No resolved problems to display.

## 2025-06-28 VITALS
WEIGHT: 158.75 LBS | SYSTOLIC BLOOD PRESSURE: 127 MMHG | HEART RATE: 56 BPM | RESPIRATION RATE: 18 BRPM | DIASTOLIC BLOOD PRESSURE: 80 MMHG | BODY MASS INDEX: 29.21 KG/M2 | TEMPERATURE: 98 F | HEIGHT: 62 IN | OXYGEN SATURATION: 96 %

## 2025-07-02 ENCOUNTER — TELEPHONE (OUTPATIENT)
Dept: VASCULAR SURGERY | Facility: CLINIC | Age: 55
End: 2025-07-02
Payer: COMMERCIAL

## 2025-07-02 NOTE — TELEPHONE ENCOUNTER
Casper Israel is s/p left brachiocephalic fistula on 6/27/2025. She was called regarding her post-operative status. She denies signs or symptoms of infection or with incision healing. She denies any numbness or pain to left hand. Follow up appointment was confirmed 8/13/25. She was advised to call with any concerns or changes to incision or hand pain. She states understanding.

## 2025-07-07 ENCOUNTER — PATIENT MESSAGE (OUTPATIENT)
Dept: TRANSPLANT | Facility: CLINIC | Age: 55
End: 2025-07-07
Payer: COMMERCIAL

## 2025-07-10 ENCOUNTER — TELEPHONE (OUTPATIENT)
Dept: TRANSPLANT | Facility: CLINIC | Age: 55
End: 2025-07-10
Payer: COMMERCIAL

## 2025-07-11 PROCEDURE — 86833 HLA CLASS II HIGH DEFIN QUAL: CPT | Mod: TXP | Performed by: NURSE PRACTITIONER

## 2025-07-11 PROCEDURE — 36415 COLL VENOUS BLD VENIPUNCTURE: CPT | Mod: TXP

## 2025-07-11 PROCEDURE — 86832 HLA CLASS I HIGH DEFIN QUAL: CPT | Mod: TXP | Performed by: NURSE PRACTITIONER

## 2025-07-14 RX ORDER — CALCITRIOL 0.25 UG/1
CAPSULE ORAL
COMMUNITY
Start: 2025-06-20

## 2025-07-14 RX ORDER — NIFEDIPINE 60 MG/1
60 TABLET, EXTENDED RELEASE ORAL 2 TIMES DAILY
COMMUNITY
Start: 2025-06-20

## 2025-07-14 RX ORDER — TACROLIMUS 1 MG/G
OINTMENT TOPICAL
COMMUNITY
Start: 2025-07-07

## 2025-07-14 NOTE — PROGRESS NOTES
"      [facility] Vascular - Clinic Note  Magdaleno Fiore MD      Patient Name: Casper Israel                   : 1970     MRN: [id number]   Visit Date: 2025          History Present Illness     Reason for Visit: Post-Operative Follow up     Ms. Israel presents to the clinic for concerns of no thrill felt to her access. She is s/p left brachiocephalic fistula on 25. She denies numbness or fatigue to her left hand since surgery. She denies fevers, drainage from her left arm incision, worsening pain or swelling at the site.  She reports she is not able to feel a thrill.        REVIEW OF SYSTEMS:  12 point review of systems conducted, negative except as stated in the history of present illness. See HPI for details.        Physical Exam      Vitals:    25 1257   BP: (!) 144/92   BP Location: Right arm   Patient Position: Sitting   Pulse: 72   Weight: 71.7 kg (158 lb)   Height: 5' 2" (1.575 m)        General: well-nourished, no acute distress, and healthy appearing, alert, pleasant, conversant, and oriented  Neurologic: cranial nerves are grossly intact, no neurologic deficits, no motor deficits, and no sensory deficits  Neck/Chest: normal , soft without lymphadenopathy, and no carotid bruits noted  Respiratory: breathing easily, without respiratory distress, and normal breath sounds  Abdomen: normal and soft  Cardiology: regular rate and rhythm and no audible murmur    Upper Extremity Arterial Exam:   Left - radial is palpable and brachial is palpable    Dialysis Access:  left brachiocephalic fistula  Assessment: no palpable thrill, faint Doppler signal identified.    Musculoskeletal:   Upper Extremity: normal left hand function            Assessment and Plan     Ms. Israel is a 55 y.o. female status post left brachiocephalic fistula.  She did have intraoperative thrombosis of her fistula and had to have revision.  I have ultrasound of the fistula and there is minimal to no flow.  I explained to her this is " likely not salvageable due to the quality of the vein.  I did recommend left arm AV graft creation to her.  She understands and agrees she would like to proceed.  I did agree that in a few weeks we will re-evaluate to make sure there is no improvement prior to proceeding with surgery.  We will do this the day of her other procedure she is coming from [place].          1. Mechanical complication of arteriovenous fistula surgically created, initial encounter  - Basic Metabolic Panel; Future  - CBC Auto Differential; Future  - EKG 12-lead; Future  - X-Ray Chest PA And Lateral; Future           Imaging Obtained/Reviewed   Study: none  Date:              Medical History     Past Medical History:   Diagnosis Date    Allergic rhinitis due to pollen     Anemia     CKD (chronic kidney disease)     Costochondritis     Depression     Diabetes mellitus, type 2     Diabetic nephropathy     Disorder of kidney and ureter     Fibroids     Focal glomerular sclerosis     GERD (gastroesophageal reflux disease)     History of lump in breast     Left    Hyperlipidemia     Hypertension     Ischemic heart disease     Nicotine dependence     Palpitations     Primigravida of advanced maternal age     Sleep apnea, unspecified      Past Surgical History:   Procedure Laterality Date    AV FISTULA PLACEMENT Left 2025    Procedure: CREATION, AV FISTULA; supraclavicular block;  Surgeon: Magdaleno Fiore MD;  Location: [facility];  Service: Peripheral Vascular;  Laterality: Left;    BREAST SURGERY       SECTION      CHOLECYSTECTOMY      HYSTERECTOMY      LAPAROSCOPIC TOTAL HYSTERECTOMY      RENAL BIOPSY       Family History   Problem Relation Name Age of Onset    Sarcoidosis Mother      Heart disease Father      Heart attack Father      COPD Sister      Lupus Sister      Kidney disease Brother       Social History[1]  Current Outpatient Medications   Medication Instructions    allopurinoL (ZYLOPRIM) 100 mg, Daily    aspirin  (ECOTRIN) 81 MG EC tablet 1 tablet, Oral    atorvastatin (LIPITOR) 40 mg, Daily    bisoprolol (ZEBETA) 2.5 mg, Daily    calcitRIOL (ROCALTROL) 0.25 MCG Cap Take by mouth.    clobetasol 0.05% (TEMOVATE) 1 g, 2 times daily    dapagliflozin propanediol (FARXIGA) 5 mg, Daily    diltiaZEM (CARDIZEM CD) 240 mg, Daily    doxazosin (CARDURA) 4 mg, Nightly    furosemide (LASIX) 20 mg, Oral, Daily PRN    HYDROcodone-acetaminophen (NORCO) 5-325 mg per tablet 1 tablet, Oral, Every 6 hours PRN    multivitamin with minerals tablet 1 tablet, Daily    NIFEdipine (PROCARDIA-XL) 60 mg, 2 times daily    olmesartan (BENICAR) 40 mg, Daily    omega-3 fatty acids/fish oil (FISH OIL-OMEGA-3 FATTY ACIDS) 300-1,000 mg capsule 2 capsules, Daily    pantoprazole (PROTONIX) 40 mg, Daily PRN    tacrolimus (PROTOPIC) 0.1 % ointment SMARTSI Topical Daily    vitamin D (VITAMIN D3) 1,000 Units     Review of patient's allergies indicates:   Allergen Reactions    Penicillins Hives and Itching       Patient Care Team:  Dimple Montesinos MD as PCP - General (Family Medicine)  Simi Monsalve MD (Nephrology)  Magdaleno Fiore MD as Vascular Surgery (Vascular Surgery)  Yamile Trejo DO (Internal Medicine)  Ernie Forrest MD as Consulting Physician (Cardiology)      No follow-ups on file. In addition to their scheduled follow up, the patient has also been instructed to follow up on as needed basis.     Future Appointments   Date Time Provider Department Center   2025  9:00 AM [facility]   2025  9:30 AM [facility]IC-US1 MASTER [facility] ULTR [facility]   2025 10:15 AM ECHO, [facility] ECHOSTR [facility]   2025 12:00 PM LAB, APPOINTMENT [facility] LAB [facility]   2025 12:30 PM KIDNEY LISTED, TRANSPLANT [facility] KIDNTX [facility]   2025 10:40 AM Magdaleno Fiore MD [facility]             [1]   Social History  Socioeconomic History    Marital status:    Tobacco Use     Smoking status: Former     Current packs/day: 0.00     Types: Cigarettes, Vaping with nicotine     Quit date: 3/1/2024     Years since quittin.3    Smokeless tobacco: Never   Vaping Use    Vaping status: Never Used   Substance and Sexual Activity    Alcohol use: Not Currently    Drug use: Never    Sexual activity: Not Currently     Social Drivers of Health     Financial Resource Strain: Low Risk  (2024)    Received from [facility]    Overall Financial Resource Strain (CARDIA)     Difficulty of Paying Living Expenses: Not hard at all   Food Insecurity: No Food Insecurity (2025)    Received from [facility]    Hunger Vital Sign     Worried About Running Out of Food in the Last Year: Never true     Ran Out of Food in the Last Year: Never true   Transportation Needs: No Transportation Needs (2024)    Received from [facility]    PRAPARE - Transportation     Lack of Transportation (Medical): No     Lack of Transportation (Non-Medical): No   Physical Activity: Insufficiently Active (2024)    Received from [facility]    Exercise Vital Sign     Days of Exercise per Week: 1 day     Minutes of Exercise per Session: 20 min   Stress: No Stress Concern Present (2024)    Received from [facility]    [employer] - Occupational Stress Questionnaire     Feeling of Stress : Not at all

## 2025-07-16 ENCOUNTER — OFFICE VISIT (OUTPATIENT)
Dept: VASCULAR SURGERY | Facility: CLINIC | Age: 55
End: 2025-07-16
Payer: COMMERCIAL

## 2025-07-16 ENCOUNTER — PATIENT MESSAGE (OUTPATIENT)
Dept: TRANSPLANT | Facility: CLINIC | Age: 55
End: 2025-07-16
Payer: COMMERCIAL

## 2025-07-16 VITALS
HEIGHT: 62 IN | SYSTOLIC BLOOD PRESSURE: 144 MMHG | HEART RATE: 72 BPM | DIASTOLIC BLOOD PRESSURE: 92 MMHG | WEIGHT: 158 LBS | BODY MASS INDEX: 29.08 KG/M2

## 2025-07-16 DIAGNOSIS — T82.590A MECHANICAL COMPLICATION OF ARTERIOVENOUS FISTULA SURGICALLY CREATED, INITIAL ENCOUNTER: Primary | ICD-10-CM

## 2025-07-16 DIAGNOSIS — T82.590A MECHANICAL COMPLICATION OF ARTERIOVENOUS FISTULA SURGICALLY CREATED: ICD-10-CM

## 2025-07-16 PROCEDURE — 3077F SYST BP >= 140 MM HG: CPT | Mod: CPTII,NTX,, | Performed by: SURGERY

## 2025-07-16 PROCEDURE — 1160F RVW MEDS BY RX/DR IN RCRD: CPT | Mod: CPTII,NTX,, | Performed by: SURGERY

## 2025-07-16 PROCEDURE — 4010F ACE/ARB THERAPY RXD/TAKEN: CPT | Mod: CPTII,NTX,, | Performed by: SURGERY

## 2025-07-16 PROCEDURE — 1159F MED LIST DOCD IN RCRD: CPT | Mod: CPTII,NTX,, | Performed by: SURGERY

## 2025-07-16 PROCEDURE — 3044F HG A1C LEVEL LT 7.0%: CPT | Mod: CPTII,NTX,, | Performed by: SURGERY

## 2025-07-16 PROCEDURE — 3080F DIAST BP >= 90 MM HG: CPT | Mod: CPTII,NTX,, | Performed by: SURGERY

## 2025-07-16 PROCEDURE — 99024 POSTOP FOLLOW-UP VISIT: CPT | Mod: NTX,,, | Performed by: SURGERY

## 2025-07-16 RX ORDER — SODIUM CHLORIDE 9 MG/ML
INJECTION, SOLUTION INTRAVENOUS CONTINUOUS
OUTPATIENT
Start: 2025-07-16

## 2025-07-17 ENCOUNTER — TELEPHONE (OUTPATIENT)
Dept: TRANSPLANT | Facility: CLINIC | Age: 55
End: 2025-07-17
Payer: COMMERCIAL

## 2025-07-17 ENCOUNTER — LAB VISIT (OUTPATIENT)
Dept: LAB | Facility: HOSPITAL | Age: 55
End: 2025-07-17
Payer: COMMERCIAL

## 2025-07-17 DIAGNOSIS — Z76.82 AWAITING ORGAN TRANSPLANT: ICD-10-CM

## 2025-07-17 NOTE — LETTER
2025    Casper Israel  [address] [place] [address]        Dear Casper Jhonatan:  MRN: [id number]  : 1970    You are scheduled on 10/10/2025 for follow up in the transplant clinic to update your records and evaluate your health status as you wait for a transplant.  It is very important that we ensure you are ready for your transplant.      Please make arrangements to be in our transplant clinic from 12:30 pm- 4 pm.  During this time you will watch an educational video and then be seen by our transplant , financial counselor, and advance practice provider.     If you have had a change in your medical history since your last visit, please call your transplant coordinator to ensure we have the medical records to review prior to your appointment.     Please bring the following with you to this appointment:  A Caregiver is REQUIRED  Bring ALL insurance information including medication card  Current list of medications  Copies of any outside medical records from the past year, such as: mammogram, pap smear, ultrasound, CAT scan, colonoscopy, cardiac angiogram or cardiac stress test    To reschedule your appointments please call [phone] or [phone] ([phone]). Please ask for the .      Failure to cancel in advance or arrive on time could result in a $50 cancellation fee.  Your transplant candidacy could be affected by no showing these appointments.  We look forward to seeing you.    Sincerely,    [facility] [place]  [facility] [address] [facility] [place] [address]  [phone]

## 2025-07-18 LAB — HPRA INTERPRETATION: NORMAL

## 2025-07-23 ENCOUNTER — PATIENT MESSAGE (OUTPATIENT)
Dept: TRANSPLANT | Facility: CLINIC | Age: 55
End: 2025-07-23
Payer: COMMERCIAL

## 2025-08-06 ENCOUNTER — ANESTHESIA EVENT (OUTPATIENT)
Dept: SURGERY | Facility: HOSPITAL | Age: 55
End: 2025-08-06
Payer: COMMERCIAL

## 2025-08-06 NOTE — ANESTHESIA PREPROCEDURE EVALUATION
2025  Casper Israel is a 55 y.o., female.      Casper Israel    Pre-op Diagnosis: Mechanical complication of arteriovenous fistula surgically created, initial encounter [T82.449A]    Procedure(s):  INSERTION, GRAFT, ARTERIOVENOUS / Left / Supraclavicular Block     Review of patient's allergies indicates:   Allergen Reactions    Penicillins Hives and Itching    Amoxicillin Hives and Itching       Current Outpatient Medications   Medication Instructions    allopurinoL (ZYLOPRIM) 100 mg, Daily    aspirin (ECOTRIN) 81 MG EC tablet 1 tablet, Oral    atorvastatin (LIPITOR) 40 mg, Daily    bisoprolol (ZEBETA) 2.5 mg, Daily    calcitRIOL (ROCALTROL) 0.25 mcg, Oral, Every Mon, Wed, Fri    clobetasol 0.05% (TEMOVATE) 1 g, 2 times daily    dapagliflozin propanediol (FARXIGA) 5 mg, Daily    diltiaZEM (CARDIZEM CD) 240 mg, Daily    doxazosin (CARDURA) 4 mg, Nightly    furosemide (LASIX) 20 mg, Oral, Daily PRN    HYDROcodone-acetaminophen (NORCO) 5-325 mg per tablet 1 tablet, Oral, Every 6 hours PRN    multivitamin with minerals tablet 1 tablet, Daily    NIFEdipine (PROCARDIA-XL) 60 mg, 2 times daily    olmesartan (BENICAR) 40 mg, Daily    omega-3 fatty acids/fish oil (FISH OIL-OMEGA-3 FATTY ACIDS) 300-1,000 mg capsule 2 capsules, Daily    pantoprazole (PROTONIX) 40 mg, Daily PRN    tacrolimus (PROTOPIC) 0.1 % ointment SMARTSI Topical Daily    UNABLE TO FIND 1 Dose    vitamin D (VITAMIN D3) 1,000 Units, Oral, Every Mon, Wed, Fri       NM CREAT AV FISTULA,NON-AUTOGENOUS GRAFT [[id number]] (INSERTION*    Past Medical History:   Diagnosis Date    Allergic rhinitis due to pollen     Anemia     CKD (chronic kidney disease)     Costochondritis     Depression     Diabetes mellitus, type 2     Diabetic nephropathy     Disorder of kidney and ureter     Eczema     Fibroids     Focal glomerular sclerosis     GERD (gastroesophageal  reflux disease)     History of lump in breast     Left    Hyperlipidemia     Hypertension     Ischemic heart disease     Nicotine dependence     Palpitations     Primigravida of advanced maternal age        Past Surgical History:   Procedure Laterality Date    AV FISTULA PLACEMENT Left 2025    Procedure: CREATION, AV FISTULA; supraclavicular block;  Surgeon: Magdaleno Fiore MD;  Location: [facility];  Service: Peripheral Vascular;  Laterality: Left;    BREAST SURGERY       SECTION      CHOLECYSTECTOMY      HYSTERECTOMY      LAPAROSCOPIC TOTAL HYSTERECTOMY      RENAL BIOPSY       Lab Results   Component Value Date    WBC 6.29 2024    HGB 11.1 (L) 2024    HCT 36 2025    MCV 92 2024     2024      Istat K 4.5 today    BMP  Lab Results   Component Value Date     2024    K 4.3 2024     (H) 2024    CO2 19 (L) 2024    BUN 45 (H) 2024    CREATININE 3.7 (H) 2024    CALCIUM 10.1 2024    ANIONGAP 9 2024             Pre-op Assessment    I have reviewed the Patient Summary Reports.    I have reviewed the NPO Status.   I have reviewed the Medications.     Review of Systems  Anesthesia Hx:  No problems with previous Anesthesia             Denies Family Hx of Anesthesia complications.    Denies Personal Hx of Anesthesia complications.                    Social:  Smoker       Cardiovascular:  Exercise tolerance: good   Hypertension       Denies Angina.   Denies Orthopnea.  Denies PND.  hyperlipidemia  Denies RODRIGUEZ.      Functional Capacity good / => 4 METS                         Renal/:  Chronic Renal Disease, CKD                Musculoskeletal:  Musculoskeletal Normal                Neurological:  Denies TIA.  Denies CVA.                                    Endocrine:  Diabetes, type 2           Psych:    depression                Physical Exam  General: Well nourished, Alert and Oriented    Airway:  Mallampati: III   Mouth  Opening: Normal  TM Distance: Normal  Tongue: Normal  Neck ROM: Normal ROM    Dental:  Intact    Chest/Lungs:  Clear to auscultation    Heart:  Rate: Normal  Rhythm: Regular Rhythm  No pretibial edema  No carotid bruits      Anesthesia Plan  Type of Anesthesia, risks & benefits discussed:    Anesthesia Type: Regional  Intra-op Monitoring Plan: Standard ASA Monitors  Post Op Pain Control Plan: multimodal analgesia  Induction:  IV  Airway Plan: Direct  Informed Consent: Informed consent signed with the Patient and all parties understand the risks and agree with anesthesia plan.  All questions answered. Patient consented to blood products? No  ASA Score: 4  Day of Surgery Review of History & Physical: H&P Update referred to the surgeon/provider.  Anesthesia Plan Notes: Supraclavicular block for surgical anesthesia  Risks including sensory & motor neuropathy, failed block, & seizure explained - pt accepts     Ready For Surgery From Anesthesia Perspective.     .

## 2025-08-07 PROCEDURE — 99001 SPECIMEN HANDLING PT-LAB: CPT | Mod: TXP | Performed by: NURSE PRACTITIONER

## 2025-08-07 PROCEDURE — 36415 COLL VENOUS BLD VENIPUNCTURE: CPT | Mod: TXP

## 2025-08-08 ENCOUNTER — HOSPITAL ENCOUNTER (OUTPATIENT)
Facility: HOSPITAL | Age: 55
Discharge: HOME | End: 2025-08-08
Attending: SURGERY | Admitting: SURGERY
Payer: COMMERCIAL

## 2025-08-08 ENCOUNTER — ANESTHESIA (OUTPATIENT)
Dept: SURGERY | Facility: HOSPITAL | Age: 55
End: 2025-08-08
Payer: COMMERCIAL

## 2025-08-08 DIAGNOSIS — T82.590A MECHANICAL COMPLICATION OF ARTERIOVENOUS FISTULA SURGICALLY CREATED, INITIAL ENCOUNTER: ICD-10-CM

## 2025-08-08 DIAGNOSIS — T82.590A MECHANICAL COMPLICATION OF ARTERIOVENOUS FISTULA SURGICALLY CREATED: ICD-10-CM

## 2025-08-08 LAB
ANION GAP SERPL CALC-SCNC: 17 MMOL/L (ref 8–16)
BUN SERPL-MCNC: 61 MG/DL (ref 6–30)
CHLORIDE SERPL-SCNC: 113 MMOL/L (ref 95–110)
CREAT SERPL-MCNC: 5.7 MG/DL (ref 0.5–1.4)
GLUCOSE SERPL-MCNC: 107 MG/DL (ref 70–110)
HCT VFR BLD CALC: 34 %PCV (ref 36–54)
HGB BLD-MCNC: 12 G/DL
POC IONIZED CALCIUM: 1.28 MMOL/L (ref 1.06–1.42)
POC TCO2 (MEASURED): 17 MMOL/L (ref 23–29)
POTASSIUM BLD-SCNC: 4.5 MMOL/L (ref 3.5–5.1)
SAMPLE: ABNORMAL
SODIUM BLD-SCNC: 142 MMOL/L (ref 136–145)

## 2025-08-08 PROCEDURE — 63600175 PHARM REV CODE 636 W HCPCS: Mod: TXP | Performed by: ANESTHESIOLOGY

## 2025-08-08 PROCEDURE — 37000008 HC ANESTHESIA 1ST 15 MINUTES: Mod: TXP | Performed by: SURGERY

## 2025-08-08 PROCEDURE — C1768 GRAFT, VASCULAR: HCPCS | Mod: TXP | Performed by: SURGERY

## 2025-08-08 PROCEDURE — 25000003 PHARM REV CODE 250: Mod: TXP | Performed by: ANESTHESIOLOGY

## 2025-08-08 PROCEDURE — 25000003 PHARM REV CODE 250: Mod: TXP | Performed by: SURGERY

## 2025-08-08 PROCEDURE — 63600175 PHARM REV CODE 636 W HCPCS: Mod: TXP | Performed by: NURSE ANESTHETIST, CERTIFIED REGISTERED

## 2025-08-08 PROCEDURE — 63600175 PHARM REV CODE 636 W HCPCS: Mod: TXP | Performed by: SURGERY

## 2025-08-08 PROCEDURE — 36000707: Mod: TXP | Performed by: SURGERY

## 2025-08-08 PROCEDURE — 37000009 HC ANESTHESIA EA ADD 15 MINS: Mod: TXP | Performed by: SURGERY

## 2025-08-08 PROCEDURE — 71000015 HC POSTOP RECOV 1ST HR: Mod: TXP | Performed by: SURGERY

## 2025-08-08 PROCEDURE — 36000706: Mod: TXP | Performed by: SURGERY

## 2025-08-08 PROCEDURE — 71000016 HC POSTOP RECOV ADDL HR: Mod: TXP | Performed by: SURGERY

## 2025-08-08 PROCEDURE — C1894 INTRO/SHEATH, NON-LASER: HCPCS | Mod: TXP | Performed by: SURGERY

## 2025-08-08 DEVICE — GRAFT PROPATEN 4-7MM X 45CM: Type: IMPLANTABLE DEVICE | Site: ARM | Status: FUNCTIONAL

## 2025-08-08 RX ORDER — PROTAMINE SULFATE 10 MG/ML
INJECTION, SOLUTION INTRAVENOUS
Status: DISCONTINUED
Start: 2025-08-08 | End: 2025-08-08 | Stop reason: WASHOUT

## 2025-08-08 RX ORDER — HYDROMORPHONE HYDROCHLORIDE 2 MG/ML
0.2 INJECTION, SOLUTION INTRAMUSCULAR; INTRAVENOUS; SUBCUTANEOUS EVERY 5 MIN PRN
Status: DISCONTINUED | OUTPATIENT
Start: 2025-08-08 | End: 2025-08-08 | Stop reason: HOSPADM

## 2025-08-08 RX ORDER — ONDANSETRON HYDROCHLORIDE 2 MG/ML
INJECTION, SOLUTION INTRAVENOUS
Status: DISCONTINUED | OUTPATIENT
Start: 2025-08-08 | End: 2025-08-08

## 2025-08-08 RX ORDER — IPRATROPIUM BROMIDE AND ALBUTEROL SULFATE 2.5; .5 MG/3ML; MG/3ML
3 SOLUTION RESPIRATORY (INHALATION)
Status: DISCONTINUED | OUTPATIENT
Start: 2025-08-08 | End: 2025-08-08 | Stop reason: HOSPADM

## 2025-08-08 RX ORDER — LIDOCAINE HYDROCHLORIDE 10 MG/ML
INJECTION, SOLUTION INFILTRATION; PERINEURAL
Status: DISCONTINUED
Start: 2025-08-08 | End: 2025-08-08 | Stop reason: WASHOUT

## 2025-08-08 RX ORDER — HYDROCODONE BITARTRATE AND ACETAMINOPHEN 5; 325 MG/1; MG/1
1 TABLET ORAL EVERY 6 HOURS PRN
Qty: 20 TABLET | Refills: 0 | Status: SHIPPED | OUTPATIENT
Start: 2025-08-08

## 2025-08-08 RX ORDER — BUPIVACAINE HYDROCHLORIDE 5 MG/ML
INJECTION, SOLUTION EPIDURAL; INTRACAUDAL; PERINEURAL
Status: DISCONTINUED
Start: 2025-08-08 | End: 2025-08-08 | Stop reason: WASHOUT

## 2025-08-08 RX ORDER — SODIUM CHLORIDE, SODIUM GLUCONATE, SODIUM ACETATE, POTASSIUM CHLORIDE AND MAGNESIUM CHLORIDE 30; 37; 368; 526; 502 MG/100ML; MG/100ML; MG/100ML; MG/100ML; MG/100ML
INJECTION, SOLUTION INTRAVENOUS CONTINUOUS
Status: DISCONTINUED | OUTPATIENT
Start: 2025-08-08 | End: 2025-08-08 | Stop reason: HOSPADM

## 2025-08-08 RX ORDER — LIDOCAINE HYDROCHLORIDE 10 MG/ML
INJECTION, SOLUTION EPIDURAL; INFILTRATION; INTRACAUDAL; PERINEURAL
Status: DISCONTINUED | OUTPATIENT
Start: 2025-08-08 | End: 2025-08-08

## 2025-08-08 RX ORDER — GLUCAGON 1 MG
1 KIT INJECTION
Status: DISCONTINUED | OUTPATIENT
Start: 2025-08-08 | End: 2025-08-08 | Stop reason: HOSPADM

## 2025-08-08 RX ORDER — SODIUM CHLORIDE 9 MG/ML
INJECTION, SOLUTION INTRAVENOUS CONTINUOUS
Status: DISCONTINUED | OUTPATIENT
Start: 2025-08-08 | End: 2025-08-08 | Stop reason: HOSPADM

## 2025-08-08 RX ORDER — ROPIVACAINE HYDROCHLORIDE 5 MG/ML
30 INJECTION, SOLUTION EPIDURAL; INFILTRATION; PERINEURAL ONCE
Status: DISCONTINUED | OUTPATIENT
Start: 2025-08-08 | End: 2025-08-08 | Stop reason: HOSPADM

## 2025-08-08 RX ORDER — DIPHENHYDRAMINE HYDROCHLORIDE 50 MG/ML
25 INJECTION, SOLUTION INTRAMUSCULAR; INTRAVENOUS EVERY 6 HOURS PRN
Status: DISCONTINUED | OUTPATIENT
Start: 2025-08-08 | End: 2025-08-08 | Stop reason: HOSPADM

## 2025-08-08 RX ORDER — SODIUM CITRATE AND CITRIC ACID MONOHYDRATE 334; 500 MG/5ML; MG/5ML
30 SOLUTION ORAL ONCE
Status: COMPLETED | OUTPATIENT
Start: 2025-08-08 | End: 2025-08-08

## 2025-08-08 RX ORDER — LIDOCAINE HYDROCHLORIDE 10 MG/ML
1 INJECTION, SOLUTION EPIDURAL; INFILTRATION; INTRACAUDAL; PERINEURAL ONCE
Status: DISCONTINUED | OUTPATIENT
Start: 2025-08-08 | End: 2025-08-08 | Stop reason: HOSPADM

## 2025-08-08 RX ORDER — MIDAZOLAM HYDROCHLORIDE 2 MG/2ML
2 INJECTION, SOLUTION INTRAMUSCULAR; INTRAVENOUS
Status: DISPENSED | OUTPATIENT
Start: 2025-08-08 | End: 2025-08-08

## 2025-08-08 RX ORDER — ROPIVACAINE HYDROCHLORIDE 5 MG/ML
INJECTION, SOLUTION EPIDURAL; INFILTRATION; PERINEURAL
Status: COMPLETED | OUTPATIENT
Start: 2025-08-08 | End: 2025-08-08

## 2025-08-08 RX ORDER — FENTANYL CITRATE 50 UG/ML
INJECTION, SOLUTION INTRAMUSCULAR; INTRAVENOUS
Status: DISCONTINUED | OUTPATIENT
Start: 2025-08-08 | End: 2025-08-08

## 2025-08-08 RX ORDER — VANCOMYCIN HCL IN 5 % DEXTROSE 1G/250ML
1000 PLASTIC BAG, INJECTION (ML) INTRAVENOUS
Status: DISCONTINUED | OUTPATIENT
Start: 2025-08-08 | End: 2025-08-08

## 2025-08-08 RX ORDER — ACETAMINOPHEN 500 MG
1000 TABLET ORAL ONCE
Status: COMPLETED | OUTPATIENT
Start: 2025-08-08 | End: 2025-08-08

## 2025-08-08 RX ORDER — HYDROCODONE BITARTRATE AND ACETAMINOPHEN 5; 325 MG/1; MG/1
1 TABLET ORAL EVERY 4 HOURS PRN
Refills: 0 | Status: DISCONTINUED | OUTPATIENT
Start: 2025-08-08 | End: 2025-08-08 | Stop reason: HOSPADM

## 2025-08-08 RX ORDER — PROCHLORPERAZINE EDISYLATE 5 MG/ML
5 INJECTION INTRAMUSCULAR; INTRAVENOUS EVERY 30 MIN PRN
Status: DISCONTINUED | OUTPATIENT
Start: 2025-08-08 | End: 2025-08-08 | Stop reason: HOSPADM

## 2025-08-08 RX ORDER — HEPARIN SODIUM 5000 [USP'U]/ML
INJECTION, SOLUTION INTRAVENOUS; SUBCUTANEOUS
Status: DISCONTINUED | OUTPATIENT
Start: 2025-08-08 | End: 2025-08-08 | Stop reason: HOSPADM

## 2025-08-08 RX ORDER — PROPOFOL 10 MG/ML
VIAL (ML) INTRAVENOUS CONTINUOUS PRN
Status: DISCONTINUED | OUTPATIENT
Start: 2025-08-08 | End: 2025-08-08

## 2025-08-08 RX ORDER — ONDANSETRON HYDROCHLORIDE 2 MG/ML
4 INJECTION, SOLUTION INTRAVENOUS DAILY PRN
Status: DISCONTINUED | OUTPATIENT
Start: 2025-08-08 | End: 2025-08-08 | Stop reason: HOSPADM

## 2025-08-08 RX ORDER — HEPARIN SODIUM 5000 [USP'U]/ML
INJECTION, SOLUTION INTRAVENOUS; SUBCUTANEOUS
Status: DISCONTINUED
Start: 2025-08-08 | End: 2025-08-08 | Stop reason: HOSPADM

## 2025-08-08 RX ADMIN — LIDOCAINE HYDROCHLORIDE 30 MG: 10 INJECTION, SOLUTION EPIDURAL; INFILTRATION; INTRACAUDAL; PERINEURAL at 01:08

## 2025-08-08 RX ADMIN — FENTANYL CITRATE 25 MCG: 50 INJECTION, SOLUTION INTRAMUSCULAR; INTRAVENOUS at 01:08

## 2025-08-08 RX ADMIN — SODIUM CHLORIDE: 9 INJECTION, SOLUTION INTRAVENOUS at 01:08

## 2025-08-08 RX ADMIN — SODIUM CITRATE AND CITRIC ACID MONOHYDRATE 30 ML: 500; 334 SOLUTION ORAL at 12:08

## 2025-08-08 RX ADMIN — ACETAMINOPHEN 1000 MG: 500 TABLET ORAL at 12:08

## 2025-08-08 RX ADMIN — DEXTROSE MONOHYDRATE 1000 MG: 50 INJECTION, SOLUTION INTRAVENOUS at 12:08

## 2025-08-08 RX ADMIN — PROPOFOL 50 MCG/KG/MIN: 10 INJECTION, EMULSION INTRAVENOUS at 01:08

## 2025-08-08 RX ADMIN — MIDAZOLAM HYDROCHLORIDE 2 MG: 1 INJECTION, SOLUTION INTRAMUSCULAR; INTRAVENOUS at 12:08

## 2025-08-08 RX ADMIN — ONDANSETRON 4 MG: 2 INJECTION INTRAMUSCULAR; INTRAVENOUS at 01:08

## 2025-08-08 RX ADMIN — ROPIVACAINE HYDROCHLORIDE 30 ML: 5 INJECTION, SOLUTION EPIDURAL; INFILTRATION; PERINEURAL at 12:08

## 2025-08-08 NOTE — ANESTHESIA POSTPROCEDURE EVALUATION
Anesthesia Post Evaluation    Patient: Casper Israel    Procedure(s) Performed: Procedure(s) (LRB):  INSERTION, GRAFT, ARTERIOVENOUS / Left / Supraclavicular Block (Left)    Final Anesthesia Type: regional      Patient location during evaluation: OPS  Patient participation: Yes- Able to Participate  Level of consciousness: awake and alert and oriented  Post-procedure vital signs: reviewed and stable  Airway patency: patent    PONV status at discharge: No PONV  Anesthetic complications: no      Cardiovascular status: blood pressure returned to baseline  Respiratory status: unassisted  Hydration status: euvolemic  Follow-up not needed.              Vitals Value Taken Time   /85 08/08/25 12:23   Temp 37.2 °C (98.9 °F) 08/08/25 12:23   Pulse 73 08/08/25 12:23   Resp 20 08/08/25 12:23   SpO2 100 % 08/08/25 12:23         No case tracking events are documented in the log.      Pain/Joel Score: Pain Rating Prior to Med Admin: 0 (8/8/2025 12:28 PM)

## 2025-08-08 NOTE — ANESTHESIA PROCEDURE NOTES
Peripheral Block    Patient location during procedure: holding area    Reason for block: primary anesthetic    Diagnosis: Mechanical complication of arteriovenous fistula surgically created, initial encounter [T82.590A]   Start time: 8/8/2025 12:55 PM  Timeout: 8/8/2025 12:55 PM   End time: 8/8/2025 12:57 PM    Staffing  Authorizing Provider: Lev Valiente MD  Performing Provider: Lev Valiente MD    Staffing  Performed by: Lve Valiente MD  Authorized by: Lev Valiente MD    Preanesthetic Checklist  Completed: patient identified, IV checked, site marked, risks and benefits discussed, surgical consent, monitors and equipment checked, pre-op evaluation and timeout performed  Peripheral Block  Patient position: sitting  Prep: ChloraPrep  Patient monitoring: heart rate, continuous pulse ox and frequent blood pressure checks  Block type: supraclavicular  Laterality: left  Injection technique: single shot  Needle  Needle type: Stimuplex   Needle gauge: 20 G  Needle length: 4 in  Needle localization: ultrasound guidance and nerve stimulator   -ultrasound image captured on disc.  Assessment  Injection assessment: negative aspiration, negative parasthesia and local visualized surrounding nerve  Paresthesia pain: immediately resolved  Heart rate change: no  Slow fractionated injection: yes  Pain Tolerance: comfortable throughout block and no complaints  Medications:    Medications: ropivacaine (NAROPIN) injection 0.5% - Perineural   30 mL - 8/8/2025 12:57:00 PM    Additional Notes  SUPRACLAVICULAR BLOCK WITH ULTRASOUND GUIDANCE-LEFT FOR SURGICAL ANESTHESIA    Local to Skin & subq: 1.5 ml with 27 ga needle   Site Prepped:  Left Supraclavicular Area / Neck wide from midline to posterior  Prep:  CHLORAPREP ALLOWED TO COMPLETELY DRY AS TO  GUIDELINES    Ultrasound was utilized to visualize the nerve bundle or sheath, as well as, to confirm placement of the needle and deposition of the local  anesthetic    BLOCK NEEDLE:  Beasley [id number] stimulating needle 20 ga 4 inch  MUSCLE STIMULATED: None  TEST DOSE: NEGATIVE  Local Anesthetic: 0.5 % NAROPIN incremental dosing (1+4+5+5+5+5+5 = total 30 ml) with aspiration between each incremental dose  Complications noted:  none apparent.    Narrative  Under ultrasound guidance a Beasley [id number] stimulating needle was inserted & placed in close proximity to the brachial plexus.  Injections were made below plexus near the corner pocket, above the plexus & at the 3 o'clock position.    Ultrasound was used to visualize the spread of the anesthetic around the plexus of nerves.    The plexus appeared anatomically  normal.    There were no apparent pathological findings

## 2025-08-08 NOTE — TRANSFER OF CARE
"Anesthesia Transfer of Care Note    Patient: Casper Israel    Procedure(s) Performed: Procedure(s) (LRB):  INSERTION, GRAFT, ARTERIOVENOUS / Left / Supraclavicular Block (Left)    Patient location: OPS    Anesthesia Type: general    Transport from OR: Transported from OR on room air with adequate spontaneous ventilation    Post pain: adequate analgesia    Post assessment: no apparent anesthetic complications    Post vital signs: stable    Level of consciousness: awake, alert and oriented    Nausea/Vomiting: no nausea/vomiting    Complications: none    Transfer of care protocol was followed      Last vitals: Visit Vitals  /85   Pulse 73   Temp 37.2 °C (98.9 °F)   Resp 20   Ht 5' 2" (1.575 m)   Wt 70.6 kg (155 lb 10.3 oz)   SpO2 100%   Breastfeeding No   BMI 28.47 kg/m²     "

## 2025-08-09 VITALS
OXYGEN SATURATION: 100 % | WEIGHT: 155.63 LBS | BODY MASS INDEX: 28.64 KG/M2 | HEIGHT: 62 IN | DIASTOLIC BLOOD PRESSURE: 84 MMHG | SYSTOLIC BLOOD PRESSURE: 132 MMHG | TEMPERATURE: 99 F | HEART RATE: 75 BPM | RESPIRATION RATE: 16 BRPM

## 2025-08-11 ENCOUNTER — TELEPHONE (OUTPATIENT)
Dept: VASCULAR SURGERY | Facility: CLINIC | Age: 55
End: 2025-08-11
Payer: COMMERCIAL

## 2025-08-13 ENCOUNTER — LAB VISIT (OUTPATIENT)
Dept: LAB | Facility: HOSPITAL | Age: 55
End: 2025-08-13
Payer: COMMERCIAL

## 2025-08-13 DIAGNOSIS — Z76.82 AWAITING ORGAN TRANSPLANT: ICD-10-CM

## 2025-08-20 LAB
HLA FREEZE AND HOLD INTERPRETATION: NORMAL
HLAFH COLLECTION DATE: NORMAL
HPRA INTERPRETATION: NORMAL

## (undated) DEVICE — GLOVE SENSICARE PI GRN 7

## (undated) DEVICE — SUT PROLENE 6-0 C-1 30IN BL

## (undated) DEVICE — LOOP STERION MINI RED 8X406MM

## (undated) DEVICE — SUT MCRYL PLUS 4-0 PS2 27IN

## (undated) DEVICE — BAG MEDI-PLAST DECANTER C-FLOW

## (undated) DEVICE — BOWL STERILE LG GRAD 32OZ

## (undated) DEVICE — PAD PREP CUFFED NS 24X48IN

## (undated) DEVICE — GLOVE SENSICARE PI MICRO 7.5

## (undated) DEVICE — PENCIL ELECSURG ROCKER 15FT

## (undated) DEVICE — SUT VICRYL 3-0 27 SH

## (undated) DEVICE — NDL HYPO REG 25G X 1 1/2

## (undated) DEVICE — ELECTRODE PATIENT RETURN DISP

## (undated) DEVICE — BLANKET WARMING LOWER BODY

## (undated) DEVICE — SUT 2-0 12-18IN SILK

## (undated) DEVICE — COVER PROBE US 5.5X58L NON LTX

## (undated) DEVICE — KIT VASCULAR LAFAYETTE

## (undated) DEVICE — SLING ARM LARGE

## (undated) DEVICE — APPLICATOR CHLORAPREP ORN 26ML

## (undated) DEVICE — SOL NORMAL USPCA 0.9%

## (undated) DEVICE — SUT PROLENE 6-0 BV-1 30IN

## (undated) DEVICE — KIT SURGIFLO HEMOSTATIC MATRIX

## (undated) DEVICE — DRAPE INCISE IOBAN 2 23X23IN

## (undated) DEVICE — Device

## (undated) DEVICE — ADHESIVE DERMABOND ADVANCED

## (undated) DEVICE — SUT 3-0 12-18IN SILK

## (undated) DEVICE — GLOVE SIGNATURE MICRO LTX 6.5

## (undated) DEVICE — PROBE DOPPLER VTI DISP 8MHZ